# Patient Record
Sex: FEMALE | Race: OTHER
[De-identification: names, ages, dates, MRNs, and addresses within clinical notes are randomized per-mention and may not be internally consistent; named-entity substitution may affect disease eponyms.]

---

## 2017-02-03 ENCOUNTER — APPOINTMENT (OUTPATIENT)
Dept: GASTROENTEROLOGY | Facility: CLINIC | Age: 72
End: 2017-02-03

## 2017-02-24 ENCOUNTER — NON-APPOINTMENT (OUTPATIENT)
Age: 72
End: 2017-02-24

## 2017-02-24 ENCOUNTER — APPOINTMENT (OUTPATIENT)
Dept: CARDIOLOGY | Facility: CLINIC | Age: 72
End: 2017-02-24

## 2017-02-24 VITALS
BODY MASS INDEX: 26.24 KG/M2 | OXYGEN SATURATION: 97 % | SYSTOLIC BLOOD PRESSURE: 113 MMHG | HEIGHT: 61 IN | HEART RATE: 66 BPM | RESPIRATION RATE: 12 BRPM | WEIGHT: 139 LBS | DIASTOLIC BLOOD PRESSURE: 74 MMHG

## 2017-02-24 DIAGNOSIS — I83.93 ASYMPTOMATIC VARICOSE VEINS OF BILATERAL LOWER EXTREMITIES: ICD-10-CM

## 2017-04-12 ENCOUNTER — RX RENEWAL (OUTPATIENT)
Age: 72
End: 2017-04-12

## 2017-05-12 ENCOUNTER — RX RENEWAL (OUTPATIENT)
Age: 72
End: 2017-05-12

## 2017-05-23 ENCOUNTER — APPOINTMENT (OUTPATIENT)
Dept: ENDOCRINOLOGY | Facility: CLINIC | Age: 72
End: 2017-05-23

## 2017-05-23 VITALS
BODY MASS INDEX: 27 KG/M2 | HEIGHT: 61 IN | DIASTOLIC BLOOD PRESSURE: 83 MMHG | WEIGHT: 143 LBS | RESPIRATION RATE: 12 BRPM | HEART RATE: 82 BPM | SYSTOLIC BLOOD PRESSURE: 144 MMHG | OXYGEN SATURATION: 99 %

## 2017-05-23 DIAGNOSIS — M25.569 PAIN IN UNSPECIFIED KNEE: ICD-10-CM

## 2017-05-23 LAB
T4 FREE SERPL-MCNC: 1.3 NG/DL
TSH SERPL-ACNC: 5.42 UIU/ML

## 2017-05-26 ENCOUNTER — RX RENEWAL (OUTPATIENT)
Age: 72
End: 2017-05-26

## 2017-06-01 ENCOUNTER — NON-APPOINTMENT (OUTPATIENT)
Age: 72
End: 2017-06-01

## 2017-06-01 ENCOUNTER — APPOINTMENT (OUTPATIENT)
Dept: CARDIOLOGY | Facility: CLINIC | Age: 72
End: 2017-06-01

## 2017-06-01 VITALS
SYSTOLIC BLOOD PRESSURE: 151 MMHG | HEIGHT: 61 IN | BODY MASS INDEX: 27 KG/M2 | WEIGHT: 143 LBS | TEMPERATURE: 99 F | DIASTOLIC BLOOD PRESSURE: 86 MMHG | HEART RATE: 78 BPM | OXYGEN SATURATION: 99 % | RESPIRATION RATE: 12 BRPM

## 2017-06-01 VITALS — DIASTOLIC BLOOD PRESSURE: 80 MMHG | HEART RATE: 72 BPM | SYSTOLIC BLOOD PRESSURE: 146 MMHG

## 2017-06-02 LAB
CHOLEST SERPL-MCNC: 187 MG/DL
CHOLEST/HDLC SERPL: 3.2 RATIO
HDLC SERPL-MCNC: 59 MG/DL
LDLC SERPL CALC-MCNC: 70 MG/DL
TRIGL SERPL-MCNC: 291 MG/DL

## 2017-06-07 LAB
ALBUMIN SERPL ELPH-MCNC: 4.3 G/DL
ALP BLD-CCNC: 82 U/L
ALT SERPL-CCNC: 13 U/L
ANION GAP SERPL CALC-SCNC: 15 MMOL/L
AST SERPL-CCNC: 20 U/L
BILIRUB SERPL-MCNC: 0.2 MG/DL
BUN SERPL-MCNC: 14 MG/DL
CALCIUM SERPL-MCNC: 9.1 MG/DL
CHLORIDE SERPL-SCNC: 102 MMOL/L
CO2 SERPL-SCNC: 24 MMOL/L
CREAT SERPL-MCNC: 0.84 MG/DL
GLUCOSE SERPL-MCNC: 87 MG/DL
POTASSIUM SERPL-SCNC: 4.4 MMOL/L
PROT SERPL-MCNC: 7.6 G/DL
SODIUM SERPL-SCNC: 141 MMOL/L

## 2017-08-01 ENCOUNTER — RX RENEWAL (OUTPATIENT)
Age: 72
End: 2017-08-01

## 2017-08-15 ENCOUNTER — RX RENEWAL (OUTPATIENT)
Age: 72
End: 2017-08-15

## 2017-09-01 ENCOUNTER — RX RENEWAL (OUTPATIENT)
Age: 72
End: 2017-09-01

## 2017-09-16 ENCOUNTER — APPOINTMENT (OUTPATIENT)
Dept: CARDIOLOGY | Facility: CLINIC | Age: 72
End: 2017-09-16
Payer: MEDICAID

## 2017-09-16 ENCOUNTER — NON-APPOINTMENT (OUTPATIENT)
Age: 72
End: 2017-09-16

## 2017-09-16 VITALS
HEART RATE: 70 BPM | RESPIRATION RATE: 12 BRPM | HEIGHT: 61 IN | BODY MASS INDEX: 27.56 KG/M2 | SYSTOLIC BLOOD PRESSURE: 133 MMHG | OXYGEN SATURATION: 99 % | WEIGHT: 146 LBS | DIASTOLIC BLOOD PRESSURE: 80 MMHG | TEMPERATURE: 98.6 F

## 2017-09-16 PROCEDURE — 99214 OFFICE O/P EST MOD 30 MIN: CPT | Mod: 25

## 2017-09-16 PROCEDURE — 93000 ELECTROCARDIOGRAM COMPLETE: CPT

## 2017-09-18 ENCOUNTER — APPOINTMENT (OUTPATIENT)
Dept: INTERNAL MEDICINE | Facility: CLINIC | Age: 72
End: 2017-09-18
Payer: MEDICAID

## 2017-09-18 VITALS
HEART RATE: 71 BPM | BODY MASS INDEX: 27.56 KG/M2 | DIASTOLIC BLOOD PRESSURE: 75 MMHG | HEIGHT: 61 IN | TEMPERATURE: 98 F | SYSTOLIC BLOOD PRESSURE: 125 MMHG | WEIGHT: 146 LBS | RESPIRATION RATE: 12 BRPM | OXYGEN SATURATION: 95 %

## 2017-09-18 DIAGNOSIS — M79.671 PAIN IN RIGHT FOOT: ICD-10-CM

## 2017-09-18 PROCEDURE — 99214 OFFICE O/P EST MOD 30 MIN: CPT

## 2017-09-20 LAB
ALBUMIN SERPL ELPH-MCNC: 4.2 G/DL
ALP BLD-CCNC: 72 U/L
ALT SERPL-CCNC: 13 U/L
AST SERPL-CCNC: 20 U/L
BILIRUB DIRECT SERPL-MCNC: 0.1 MG/DL
BILIRUB INDIRECT SERPL-MCNC: 0.2 MG/DL
BILIRUB SERPL-MCNC: 0.3 MG/DL
CHOLEST SERPL-MCNC: 159 MG/DL
CHOLEST/HDLC SERPL: 2.8 RATIO
HDLC SERPL-MCNC: 56 MG/DL
LDLC SERPL CALC-MCNC: 74 MG/DL
PROT SERPL-MCNC: 7.1 G/DL
TRIGL SERPL-MCNC: 145 MG/DL

## 2017-10-03 ENCOUNTER — RX RENEWAL (OUTPATIENT)
Age: 72
End: 2017-10-03

## 2017-10-03 ENCOUNTER — MEDICATION RENEWAL (OUTPATIENT)
Age: 72
End: 2017-10-03

## 2017-10-06 ENCOUNTER — RESULT REVIEW (OUTPATIENT)
Age: 72
End: 2017-10-06

## 2017-10-06 ENCOUNTER — RX RENEWAL (OUTPATIENT)
Age: 72
End: 2017-10-06

## 2017-11-07 ENCOUNTER — APPOINTMENT (OUTPATIENT)
Dept: ENDOCRINOLOGY | Facility: CLINIC | Age: 72
End: 2017-11-07

## 2017-11-19 ENCOUNTER — LABORATORY RESULT (OUTPATIENT)
Age: 72
End: 2017-11-19

## 2017-11-20 ENCOUNTER — APPOINTMENT (OUTPATIENT)
Dept: CARDIOLOGY | Facility: CLINIC | Age: 72
End: 2017-11-20

## 2017-11-28 ENCOUNTER — APPOINTMENT (OUTPATIENT)
Dept: ENDOCRINOLOGY | Facility: CLINIC | Age: 72
End: 2017-11-28
Payer: MEDICAID

## 2017-11-28 VITALS
DIASTOLIC BLOOD PRESSURE: 83 MMHG | HEART RATE: 55 BPM | HEIGHT: 61 IN | OXYGEN SATURATION: 100 % | WEIGHT: 147 LBS | BODY MASS INDEX: 27.75 KG/M2 | RESPIRATION RATE: 12 BRPM | SYSTOLIC BLOOD PRESSURE: 133 MMHG

## 2017-11-28 PROCEDURE — 99213 OFFICE O/P EST LOW 20 MIN: CPT

## 2017-12-02 ENCOUNTER — APPOINTMENT (OUTPATIENT)
Dept: CARDIOLOGY | Facility: CLINIC | Age: 72
End: 2017-12-02
Payer: MEDICAID

## 2017-12-02 ENCOUNTER — NON-APPOINTMENT (OUTPATIENT)
Age: 72
End: 2017-12-02

## 2017-12-02 VITALS
RESPIRATION RATE: 12 BRPM | SYSTOLIC BLOOD PRESSURE: 138 MMHG | TEMPERATURE: 97.6 F | DIASTOLIC BLOOD PRESSURE: 80 MMHG | HEIGHT: 61 IN | HEART RATE: 70 BPM | WEIGHT: 143 LBS | OXYGEN SATURATION: 99 % | BODY MASS INDEX: 27 KG/M2

## 2017-12-02 VITALS — DIASTOLIC BLOOD PRESSURE: 68 MMHG | SYSTOLIC BLOOD PRESSURE: 130 MMHG

## 2017-12-02 PROCEDURE — 93000 ELECTROCARDIOGRAM COMPLETE: CPT

## 2017-12-02 PROCEDURE — 99214 OFFICE O/P EST MOD 30 MIN: CPT | Mod: 25

## 2017-12-03 ENCOUNTER — NON-APPOINTMENT (OUTPATIENT)
Age: 72
End: 2017-12-03

## 2017-12-19 ENCOUNTER — APPOINTMENT (OUTPATIENT)
Dept: ENDOCRINOLOGY | Facility: CLINIC | Age: 72
End: 2017-12-19

## 2018-01-10 ENCOUNTER — APPOINTMENT (OUTPATIENT)
Dept: CARDIOLOGY | Facility: CLINIC | Age: 73
End: 2018-01-10

## 2018-01-16 LAB
T4 FREE SERPL-MCNC: 1.7 NG/DL
TSH SERPL-ACNC: 0.57 UIU/ML

## 2018-02-26 ENCOUNTER — RX RENEWAL (OUTPATIENT)
Age: 73
End: 2018-02-26

## 2018-03-27 ENCOUNTER — RX RENEWAL (OUTPATIENT)
Age: 73
End: 2018-03-27

## 2018-04-25 ENCOUNTER — MEDICATION RENEWAL (OUTPATIENT)
Age: 73
End: 2018-04-25

## 2018-04-28 ENCOUNTER — APPOINTMENT (OUTPATIENT)
Dept: CARDIOLOGY | Facility: CLINIC | Age: 73
End: 2018-04-28
Payer: MEDICAID

## 2018-04-28 ENCOUNTER — NON-APPOINTMENT (OUTPATIENT)
Age: 73
End: 2018-04-28

## 2018-04-28 VITALS
RESPIRATION RATE: 12 BRPM | HEIGHT: 61 IN | WEIGHT: 147 LBS | DIASTOLIC BLOOD PRESSURE: 84 MMHG | BODY MASS INDEX: 27.75 KG/M2 | HEART RATE: 74 BPM | TEMPERATURE: 98.3 F | OXYGEN SATURATION: 98 % | SYSTOLIC BLOOD PRESSURE: 128 MMHG

## 2018-04-28 PROCEDURE — 99214 OFFICE O/P EST MOD 30 MIN: CPT | Mod: 25

## 2018-04-28 PROCEDURE — 93000 ELECTROCARDIOGRAM COMPLETE: CPT

## 2018-04-29 LAB
25(OH)D3 SERPL-MCNC: 36.7 NG/ML
ALBUMIN SERPL ELPH-MCNC: 4 G/DL
ALP BLD-CCNC: 74 U/L
ALT SERPL-CCNC: 13 U/L
ANION GAP SERPL CALC-SCNC: 14 MMOL/L
AST SERPL-CCNC: 26 U/L
BASOPHILS # BLD AUTO: 0.03 K/UL
BASOPHILS NFR BLD AUTO: 0.6 %
BILIRUB SERPL-MCNC: 0.4 MG/DL
BUN SERPL-MCNC: 11 MG/DL
CALCIUM SERPL-MCNC: 9.3 MG/DL
CHLORIDE SERPL-SCNC: 103 MMOL/L
CHOLEST SERPL-MCNC: 164 MG/DL
CHOLEST/HDLC SERPL: 2.9 RATIO
CO2 SERPL-SCNC: 25 MMOL/L
CREAT SERPL-MCNC: 0.93 MG/DL
EOSINOPHIL # BLD AUTO: 0.15 K/UL
EOSINOPHIL NFR BLD AUTO: 2.8 %
GLUCOSE SERPL-MCNC: 75 MG/DL
HBA1C MFR BLD HPLC: 5.3 %
HCT VFR BLD CALC: 40.7 %
HDLC SERPL-MCNC: 57 MG/DL
HGB BLD-MCNC: 12.5 G/DL
IMM GRANULOCYTES NFR BLD AUTO: 0.2 %
LDLC SERPL CALC-MCNC: 80 MG/DL
LYMPHOCYTES # BLD AUTO: 1.69 K/UL
LYMPHOCYTES NFR BLD AUTO: 31.1 %
MAN DIFF?: NORMAL
MCHC RBC-ENTMCNC: 29.1 PG
MCHC RBC-ENTMCNC: 30.7 GM/DL
MCV RBC AUTO: 94.7 FL
MONOCYTES # BLD AUTO: 0.42 K/UL
MONOCYTES NFR BLD AUTO: 7.7 %
NEUTROPHILS # BLD AUTO: 3.14 K/UL
NEUTROPHILS NFR BLD AUTO: 57.6 %
PLATELET # BLD AUTO: 245 K/UL
POTASSIUM SERPL-SCNC: 4.8 MMOL/L
PROT SERPL-MCNC: 7.1 G/DL
RBC # BLD: 4.3 M/UL
RBC # FLD: 13.5 %
SODIUM SERPL-SCNC: 142 MMOL/L
T4 FREE SERPL-MCNC: 1.9 NG/DL
TRIGL SERPL-MCNC: 135 MG/DL
TSH SERPL-ACNC: 0.65 UIU/ML
WBC # FLD AUTO: 5.44 K/UL

## 2018-06-22 ENCOUNTER — APPOINTMENT (OUTPATIENT)
Dept: INTERNAL MEDICINE | Facility: CLINIC | Age: 73
End: 2018-06-22
Payer: MEDICAID

## 2018-06-22 VITALS
DIASTOLIC BLOOD PRESSURE: 80 MMHG | OXYGEN SATURATION: 100 % | WEIGHT: 143 LBS | HEART RATE: 100 BPM | HEIGHT: 61 IN | BODY MASS INDEX: 27 KG/M2 | TEMPERATURE: 98.8 F | SYSTOLIC BLOOD PRESSURE: 138 MMHG | RESPIRATION RATE: 12 BRPM

## 2018-06-22 DIAGNOSIS — Z87.09 PERSONAL HISTORY OF OTHER DISEASES OF THE RESPIRATORY SYSTEM: ICD-10-CM

## 2018-06-22 DIAGNOSIS — B97.89 ACUTE UPPER RESPIRATORY INFECTION, UNSPECIFIED: ICD-10-CM

## 2018-06-22 DIAGNOSIS — J06.9 ACUTE UPPER RESPIRATORY INFECTION, UNSPECIFIED: ICD-10-CM

## 2018-06-22 DIAGNOSIS — B34.9 VIRAL INFECTION, UNSPECIFIED: ICD-10-CM

## 2018-06-22 PROCEDURE — 94640 AIRWAY INHALATION TREATMENT: CPT

## 2018-06-22 PROCEDURE — 99214 OFFICE O/P EST MOD 30 MIN: CPT | Mod: 25

## 2018-06-22 RX ORDER — MULTIVIT-MIN/FOLIC/VIT K/LYCOP 400-300MCG
1000 TABLET ORAL DAILY
Qty: 30 | Refills: 1 | Status: ACTIVE | COMMUNITY
Start: 2018-06-22 | End: 1900-01-01

## 2018-06-22 RX ADMIN — ALBUTEROL SULFATE 1 0.083%: 2.5 SOLUTION RESPIRATORY (INHALATION) at 00:00

## 2018-06-27 RX ORDER — ALBUTEROL SULFATE 2.5 MG/3ML
(2.5 MG/3ML) SOLUTION RESPIRATORY (INHALATION)
Qty: 0 | Refills: 0 | Status: COMPLETED | OUTPATIENT
Start: 2018-06-22

## 2018-07-21 NOTE — PHYSICAL EXAM
[No Acute Distress] : no acute distress [Well Nourished] : well nourished [Well Developed] : well developed [Well-Appearing] : well-appearing [Normal Sclera/Conjunctiva] : normal sclera/conjunctiva [EOMI] : extraocular movements intact [Normal Outer Ear/Nose] : the outer ears and nose were normal in appearance [Normal TMs] : both tympanic membranes were normal [No JVD] : no jugular venous distention [Supple] : supple [No Lymphadenopathy] : no lymphadenopathy [Thyroid Normal, No Nodules] : the thyroid was normal and there were no nodules present [No Respiratory Distress] : no respiratory distress  [No Accessory Muscle Use] : no accessory muscle use [Normal Rate] : normal rate  [Regular Rhythm] : with a regular rhythm [Normal S1, S2] : normal S1 and S2 [No Murmur] : no murmur heard [Soft] : abdomen soft [Non Tender] : non-tender [Non-distended] : non-distended [No Masses] : no abdominal mass palpated [No HSM] : no HSM [Normal Bowel Sounds] : normal bowel sounds [Normal Posterior Cervical Nodes] : no posterior cervical lymphadenopathy [Normal Anterior Cervical Nodes] : no anterior cervical lymphadenopathy [No CVA Tenderness] : no CVA  tenderness [No Spinal Tenderness] : no spinal tenderness [No Joint Swelling] : no joint swelling [Grossly Normal Strength/Tone] : grossly normal strength/tone [No Rash] : no rash [Normal Gait] : normal gait [Coordination Grossly Intact] : coordination grossly intact [No Focal Deficits] : no focal deficits [Deep Tendon Reflexes (DTR)] : deep tendon reflexes were 2+ and symmetric [Normal Affect] : the affect was normal [Normal Insight/Judgement] : insight and judgment were intact [de-identified] : nasal mucosal congestion with clear discharge; trace PND on oropharynx  [de-identified] : decreased aeration; scattered rhonchi B/L on expiration

## 2018-07-21 NOTE — ASSESSMENT
[FreeTextEntry1] : ______________________________________________________________ \par \par Procedure:   Pt given nebulizer w/albuterol.  \par Patient's lung exam after treatment: Improved aeration, more audible wheezes diffusely. \par Patient stated chest felt clearer and breathing was easier.  Patient denies any side effects.\par ______________________________________________________________ \par \par \par Assessment & Plan \par \par 73 yr old female presenting today w/ acute bronchitis w/bronchospasm and upper respiratory tract dz, w/cough, nasal congestion, and rhinorrhea.  Supportive care d/w pt:  rest, fluids, fever/pain mgmt, sx mgmt w/OTC remedies, rx Tamiflu, Ventolin and Flonase usage and sfx.  RTO if sx persist or worsen. Counseled patient for greater than 50% of this visit, including diagnoses information, medication usage and side effects, therapeutic goals and options, and followup. Patient's questions were answered. Patient expressed understanding of, and agreement with, assessment and plan.

## 2018-07-21 NOTE — HISTORY OF PRESENT ILLNESS
[Cold Symptoms] : cold symptoms [Earache (L)] : pain in left ear [Earache (R)] : pain in right ear [Moderate] : moderate [___ Days ago] : [unfilled] days ago [Constant] : constant [Congestion] : congestion [Cough] : cough [Sore Throat] : sore throat [Wheezing] : wheezing [Chills] : chills [Shortness Of Breath] : shortness of breath [Earache] : earache [Fatigue] : fatigue [Fever] : fever [Improving] : improving [FreeTextEntry5] : Vit C and ibuprofen [FreeTextEntry6] : slowly

## 2018-07-21 NOTE — REVIEW OF SYSTEMS
[Wheezing] : wheezing [Cough] : cough [Negative] : Heme/Lymph [FreeTextEntry2] : see HPI [FreeTextEntry4] : see HPI [FreeTextEntry6] : see HPI

## 2018-09-11 ENCOUNTER — APPOINTMENT (OUTPATIENT)
Dept: ENDOCRINOLOGY | Facility: CLINIC | Age: 73
End: 2018-09-11

## 2018-09-28 ENCOUNTER — RX RENEWAL (OUTPATIENT)
Age: 73
End: 2018-09-28

## 2018-10-29 ENCOUNTER — MEDICATION RENEWAL (OUTPATIENT)
Age: 73
End: 2018-10-29

## 2018-10-31 ENCOUNTER — APPOINTMENT (OUTPATIENT)
Dept: CARDIOLOGY | Facility: CLINIC | Age: 73
End: 2018-10-31
Payer: MEDICAID

## 2018-10-31 ENCOUNTER — NON-APPOINTMENT (OUTPATIENT)
Age: 73
End: 2018-10-31

## 2018-10-31 VITALS
RESPIRATION RATE: 12 BRPM | SYSTOLIC BLOOD PRESSURE: 138 MMHG | WEIGHT: 145 LBS | HEIGHT: 61 IN | HEART RATE: 81 BPM | OXYGEN SATURATION: 97 % | BODY MASS INDEX: 27.38 KG/M2 | DIASTOLIC BLOOD PRESSURE: 79 MMHG

## 2018-10-31 PROCEDURE — 99214 OFFICE O/P EST MOD 30 MIN: CPT | Mod: 25

## 2018-10-31 PROCEDURE — 93000 ELECTROCARDIOGRAM COMPLETE: CPT

## 2018-11-24 LAB
25(OH)D3 SERPL-MCNC: 41.9 NG/ML
ALBUMIN SERPL ELPH-MCNC: 4.1 G/DL
ALP BLD-CCNC: 67 U/L
ALT SERPL-CCNC: 14 U/L
ANION GAP SERPL CALC-SCNC: 15 MMOL/L
AST SERPL-CCNC: 24 U/L
BASOPHILS # BLD AUTO: 0.02 K/UL
BASOPHILS NFR BLD AUTO: 0.4 %
BILIRUB SERPL-MCNC: 0.4 MG/DL
BUN SERPL-MCNC: 12 MG/DL
CALCIUM SERPL-MCNC: 9.4 MG/DL
CHLORIDE SERPL-SCNC: 103 MMOL/L
CHOLEST SERPL-MCNC: 186 MG/DL
CHOLEST/HDLC SERPL: 3.3 RATIO
CO2 SERPL-SCNC: 25 MMOL/L
CREAT SERPL-MCNC: 0.94 MG/DL
EOSINOPHIL # BLD AUTO: 0.04 K/UL
EOSINOPHIL NFR BLD AUTO: 0.8 %
GLUCOSE SERPL-MCNC: 71 MG/DL
HBA1C MFR BLD HPLC: 5.3 %
HCT VFR BLD CALC: 40 %
HDLC SERPL-MCNC: 56 MG/DL
HGB BLD-MCNC: 12.5 G/DL
IMM GRANULOCYTES NFR BLD AUTO: 0.2 %
LDLC SERPL CALC-MCNC: 109 MG/DL
LYMPHOCYTES # BLD AUTO: 1.64 K/UL
LYMPHOCYTES NFR BLD AUTO: 32.8 %
MAN DIFF?: NORMAL
MCHC RBC-ENTMCNC: 29.6 PG
MCHC RBC-ENTMCNC: 31.3 GM/DL
MCV RBC AUTO: 94.8 FL
MONOCYTES # BLD AUTO: 0.42 K/UL
MONOCYTES NFR BLD AUTO: 8.4 %
NEUTROPHILS # BLD AUTO: 2.87 K/UL
NEUTROPHILS NFR BLD AUTO: 57.4 %
PLATELET # BLD AUTO: 257 K/UL
POTASSIUM SERPL-SCNC: 4.7 MMOL/L
PROT SERPL-MCNC: 7.4 G/DL
RBC # BLD: 4.22 M/UL
RBC # FLD: 13.5 %
SODIUM SERPL-SCNC: 143 MMOL/L
T4 FREE SERPL-MCNC: 1.8 NG/DL
TRIGL SERPL-MCNC: 106 MG/DL
TSH SERPL-ACNC: 0.29 UIU/ML
WBC # FLD AUTO: 5 K/UL

## 2018-12-11 ENCOUNTER — APPOINTMENT (OUTPATIENT)
Dept: ENDOCRINOLOGY | Facility: CLINIC | Age: 73
End: 2018-12-11
Payer: MEDICAID

## 2018-12-11 VITALS
HEART RATE: 83 BPM | RESPIRATION RATE: 12 BRPM | DIASTOLIC BLOOD PRESSURE: 82 MMHG | BODY MASS INDEX: 27.56 KG/M2 | OXYGEN SATURATION: 98 % | HEIGHT: 61 IN | SYSTOLIC BLOOD PRESSURE: 123 MMHG | WEIGHT: 146 LBS | TEMPERATURE: 98.3 F

## 2018-12-11 PROCEDURE — 99215 OFFICE O/P EST HI 40 MIN: CPT

## 2018-12-17 LAB
T4 FREE SERPL-MCNC: 1.7 NG/DL
TSH SERPL-ACNC: 0.3 UIU/ML

## 2019-02-04 ENCOUNTER — NON-APPOINTMENT (OUTPATIENT)
Age: 74
End: 2019-02-04

## 2019-02-04 ENCOUNTER — APPOINTMENT (OUTPATIENT)
Dept: INTERNAL MEDICINE | Facility: CLINIC | Age: 74
End: 2019-02-04
Payer: MEDICAID

## 2019-02-04 VITALS
WEIGHT: 143 LBS | SYSTOLIC BLOOD PRESSURE: 128 MMHG | OXYGEN SATURATION: 98 % | HEIGHT: 61 IN | DIASTOLIC BLOOD PRESSURE: 77 MMHG | BODY MASS INDEX: 27 KG/M2 | HEART RATE: 73 BPM | RESPIRATION RATE: 12 BRPM

## 2019-02-04 DIAGNOSIS — M54.5 LOW BACK PAIN: ICD-10-CM

## 2019-02-04 DIAGNOSIS — M25.562 PAIN IN LEFT KNEE: ICD-10-CM

## 2019-02-04 PROCEDURE — 99213 OFFICE O/P EST LOW 20 MIN: CPT | Mod: 25

## 2019-02-04 PROCEDURE — 93000 ELECTROCARDIOGRAM COMPLETE: CPT

## 2019-02-04 PROCEDURE — 99397 PER PM REEVAL EST PAT 65+ YR: CPT | Mod: 25

## 2019-02-06 LAB
25(OH)D3 SERPL-MCNC: 44.5 NG/ML
ALBUMIN SERPL ELPH-MCNC: 4.2 G/DL
ALP BLD-CCNC: 74 U/L
ALT SERPL-CCNC: 13 U/L
ANION GAP SERPL CALC-SCNC: 15 MMOL/L
APPEARANCE: CLEAR
AST SERPL-CCNC: 25 U/L
BASOPHILS # BLD AUTO: 0.03 K/UL
BASOPHILS NFR BLD AUTO: 0.4 %
BILIRUB SERPL-MCNC: 0.2 MG/DL
BILIRUBIN URINE: NEGATIVE
BLOOD URINE: NEGATIVE
BUN SERPL-MCNC: 15 MG/DL
CALCIUM SERPL-MCNC: 9.8 MG/DL
CHLORIDE SERPL-SCNC: 97 MMOL/L
CHOLEST SERPL-MCNC: 196 MG/DL
CHOLEST/HDLC SERPL: 3.4 RATIO
CO2 SERPL-SCNC: 27 MMOL/L
COLOR: YELLOW
CREAT SERPL-MCNC: 0.9 MG/DL
EOSINOPHIL # BLD AUTO: 0.11 K/UL
EOSINOPHIL NFR BLD AUTO: 1.6 %
GLUCOSE QUALITATIVE U: NEGATIVE MG/DL
GLUCOSE SERPL-MCNC: 85 MG/DL
HBA1C MFR BLD HPLC: 5.2 %
HCT VFR BLD CALC: 41.7 %
HDLC SERPL-MCNC: 58 MG/DL
HGB BLD-MCNC: 13.1 G/DL
IMM GRANULOCYTES NFR BLD AUTO: 0.1 %
KETONES URINE: NEGATIVE
LDLC SERPL CALC-MCNC: 112 MG/DL
LEUKOCYTE ESTERASE URINE: NEGATIVE
LYMPHOCYTES # BLD AUTO: 2.48 K/UL
LYMPHOCYTES NFR BLD AUTO: 35.7 %
MAN DIFF?: NORMAL
MCHC RBC-ENTMCNC: 29 PG
MCHC RBC-ENTMCNC: 31.4 GM/DL
MCV RBC AUTO: 92.3 FL
MONOCYTES # BLD AUTO: 0.57 K/UL
MONOCYTES NFR BLD AUTO: 8.2 %
NEUTROPHILS # BLD AUTO: 3.75 K/UL
NEUTROPHILS NFR BLD AUTO: 54 %
NITRITE URINE: NEGATIVE
PH URINE: 5.5
PLATELET # BLD AUTO: 251 K/UL
POTASSIUM SERPL-SCNC: 4.2 MMOL/L
PROT SERPL-MCNC: 7.6 G/DL
PROTEIN URINE: NEGATIVE MG/DL
RBC # BLD: 4.52 M/UL
RBC # FLD: 13.5 %
SODIUM SERPL-SCNC: 139 MMOL/L
SPECIFIC GRAVITY URINE: 1.01
TRIGL SERPL-MCNC: 128 MG/DL
TSH SERPL-ACNC: 2.34 UIU/ML
UROBILINOGEN URINE: NEGATIVE MG/DL
VIT B12 SERPL-MCNC: 1466 PG/ML
WBC # FLD AUTO: 6.95 K/UL

## 2019-02-13 ENCOUNTER — NON-APPOINTMENT (OUTPATIENT)
Age: 74
End: 2019-02-13

## 2019-02-18 NOTE — HISTORY OF PRESENT ILLNESS
[de-identified] : 74 yo female, with Hx of hypercholesterolemia, hypothyroid, SVT, accompanied by her daughter, presents for annual physical\par Daughter states that yesterday around 1 pm pt had an episode where she felt a mild headache followed by nausea, broke into a cold sweat and felt palpitations. Episode lasted about 5 minutes and afterwards pt says she felt fine.  Had some carrot juice and a banana after the episode. Pt denies having chest pain, SOB, dizziness\par Pt says she thinks it was because she did not eat her regular breakfast, had less than usual. Daughter says that about a half hour before the episode pt told her that she was feeling hungry\par Also pt complaints of pain in her back and left knee on and off. She takes Ibuprofen which helps with the pain

## 2019-02-18 NOTE — ASSESSMENT
[FreeTextEntry1] : Physical\par blood work ordered\par referred for mammography and Bone density scan\par She is UTD with her PAP and colonoscopy\par does not want flu vaccine\par \par episode of nausea, sweating, palpitations-? hypoglycemic episode\par EKG: NSR\par holter monitor ordered\par f/u with cardiology\par \par Low back pain, left kneee pain-likely arthritis\par cont ibuprofen prn\par referred for PT\par \par  Hx of hypercholesterolemia, hypothyroid, SVT\par cont current meds\par \par f/u in one week for lab results\par \par

## 2019-02-18 NOTE — HEALTH RISK ASSESSMENT
[] : No [MammogramDate] : March 2017 [PapSmearDate] : 2018 [BoneDensityDate] : 2 years ago [ColonoscopyDate] : 1/11/17

## 2019-03-11 ENCOUNTER — APPOINTMENT (OUTPATIENT)
Dept: CARDIOLOGY | Facility: CLINIC | Age: 74
End: 2019-03-11
Payer: MEDICAID

## 2019-03-11 ENCOUNTER — NON-APPOINTMENT (OUTPATIENT)
Age: 74
End: 2019-03-11

## 2019-03-11 VITALS
HEART RATE: 98 BPM | HEIGHT: 61 IN | WEIGHT: 146 LBS | DIASTOLIC BLOOD PRESSURE: 80 MMHG | TEMPERATURE: 98.3 F | SYSTOLIC BLOOD PRESSURE: 142 MMHG | BODY MASS INDEX: 27.56 KG/M2 | RESPIRATION RATE: 12 BRPM | OXYGEN SATURATION: 98 %

## 2019-03-11 VITALS — DIASTOLIC BLOOD PRESSURE: 70 MMHG | SYSTOLIC BLOOD PRESSURE: 120 MMHG | HEART RATE: 84 BPM

## 2019-03-11 PROCEDURE — 99214 OFFICE O/P EST MOD 30 MIN: CPT | Mod: 25

## 2019-03-11 PROCEDURE — 93000 ELECTROCARDIOGRAM COMPLETE: CPT

## 2019-03-11 NOTE — DISCUSSION/SUMMARY
[FreeTextEntry1] : IMPRESSION: Ms. Dill is a 73 year old woman with a history of hyperlipidemia, mitral regurgitation, hypothyroidism, SVT, and APCs who presents today for follow up of rhythm disorder and hyperlipidemia.\par \par PLAN:\par 1. There was no ectopy on exam or her ECG, however, her heart rate is on the higher side which she feels is secondary to being tired as she has been on the go all day and may possibly be dehydrated. I have asked her to stay well hydrated. Her recent Holter revealed occasional APCs. We discussed possibly increasing the dose of her Metoprolol, however, she wants to hold off since she has been feeling well. For now, she will continue on Toprol XL 37.5mg daily. She will have a 2 day ZIO Patch in 4-6 weeks.\par 2. She will continue on Lipitor 10mg every other day along with diet modification given that her LDL has been trending up.   \par 3. I will discuss with her at the time of her next visit about scheduling a follow up echocardiogram for her mitral regurgitation.\par 4. She will follow up with me in 3 months or sooner should she experience any recurrent symptoms or should there be any changes on her repeat Holter monitor.

## 2019-03-11 NOTE — REVIEW OF SYSTEMS
[see HPI] : see HPI [Palpitations] : palpitations [Negative] : Heme/Lymph [Chest Pain] : no chest pain

## 2019-03-11 NOTE — PHYSICAL EXAM
[General Appearance - Well Developed] : well developed [Normal Appearance] : normal appearance [Well Groomed] : well groomed [General Appearance - Well Nourished] : well nourished [No Deformities] : no deformities [General Appearance - In No Acute Distress] : no acute distress [Normal Conjunctiva] : the conjunctiva exhibited no abnormalities [Normal Oral Mucosa] : normal oral mucosa [No Oral Pallor] : no oral pallor [No Oral Cyanosis] : no oral cyanosis [Normal Jugular Venous A Waves Present] : normal jugular venous A waves present [Normal Jugular Venous V Waves Present] : normal jugular venous V waves present [No Jugular Venous Jo A Waves] : no jugular venous jo A waves [Respiration, Rhythm And Depth] : normal respiratory rhythm and effort [Exaggerated Use Of Accessory Muscles For Inspiration] : no accessory muscle use [Auscultation Breath Sounds / Voice Sounds] : lungs were clear to auscultation bilaterally [Bowel Sounds] : normal bowel sounds [Abdomen Soft] : soft [Abdomen Tenderness] : non-tender [Abnormal Walk] : normal gait [Nail Clubbing] : no clubbing of the fingernails [Cyanosis, Localized] : no localized cyanosis [Petechial Hemorrhages (___cm)] : no petechial hemorrhages [Skin Color & Pigmentation] : normal skin color and pigmentation [] : no rash [Skin Lesions] : no skin lesions [No Skin Ulcers] : no skin ulcer [Oriented To Time, Place, And Person] : oriented to person, place, and time [Affect] : the affect was normal [Mood] : the mood was normal [No Anxiety] : not feeling anxious [Normal Rate] : normal [Rhythm Regular] : regular [Normal S1] : normal S1 [Normal S2] : normal S2 [II] : a grade 2 [I] : a grade 1 [No Pitting Edema] : no pitting edema present [FreeTextEntry1] : Extraocular muscles intact. Anicteric sclerae. [Right Carotid Bruit] : no bruit heard over the right carotid [Left Carotid Bruit] : no bruit heard over the left carotid [Bruit] : no bruit heard

## 2019-03-11 NOTE — HISTORY OF PRESENT ILLNESS
[FreeTextEntry1] : Patient is a 73 year old woman with a history of hyperlipidemia, hypothyroidism, mitral regurgitation, SVT, and APCs who presents today for follow up of her rhythm disorder and hyperlipidemia. She states that about a month ago she had an episode of diaphoresis, palpitations, and a headache that lasted for about 1 minute. She has not experienced any similar episodes since then. She also states that the dose of her Synthroid was changes about 2-3 months ago. She otherwise denies any chest pain, dyspnea, or dizziness.

## 2019-04-02 DIAGNOSIS — R92.2 INCONCLUSIVE MAMMOGRAM: ICD-10-CM

## 2019-04-04 PROBLEM — R92.2 DENSE BREAST TISSUE ON MAMMOGRAM: Status: ACTIVE | Noted: 2019-04-04

## 2019-05-08 ENCOUNTER — NON-APPOINTMENT (OUTPATIENT)
Age: 74
End: 2019-05-08

## 2019-06-03 ENCOUNTER — APPOINTMENT (OUTPATIENT)
Dept: INTERNAL MEDICINE | Facility: CLINIC | Age: 74
End: 2019-06-03
Payer: MEDICAID

## 2019-06-03 VITALS
BODY MASS INDEX: 26.43 KG/M2 | DIASTOLIC BLOOD PRESSURE: 80 MMHG | RESPIRATION RATE: 12 BRPM | TEMPERATURE: 97.5 F | HEIGHT: 61 IN | OXYGEN SATURATION: 97 % | WEIGHT: 140 LBS | HEART RATE: 71 BPM | SYSTOLIC BLOOD PRESSURE: 144 MMHG

## 2019-06-03 PROCEDURE — 99214 OFFICE O/P EST MOD 30 MIN: CPT

## 2019-06-09 NOTE — HISTORY OF PRESENT ILLNESS
[de-identified] : 75 yo female, with Hx of hypercholesterolemia, hypothyroid, SVT, accompanied by her son, presents for post ER follow up visit\par Son states that 10 days ago on Saturday she woke up with a lot of pain going down the left leg. She went to an urgent care center the next day on Sunday.  She was diagnosed with sciatica, was given an injection, was prescribed Flexeril 5mg and was told to take 3 Advils. Pain did not get better and on Tuesday she went to [facility] ER. She was prescribed Tramadol 50mg and Ibuprofen 600mg. She also had another injection for pain.\par She saw Ortho on Friday. Was referred for MRI of L/S spine-awaiting authorization, prescribed medrol dose pack and Gabapentin 100mg TID\par She did not start the medrol dose pack yet ( as per pt's son they were told to check with her PMD prior to starting it-because pt is on fosamax)  She is taking the gabapentin and ibuprofen 600mg \par Pt states she still has pain going down the left leg feels tingling in the left leg down to the foot\par She has difficulty walking, siting\par

## 2019-06-09 NOTE — PHYSICAL EXAM
[No Acute Distress] : no acute distress [Well Nourished] : well nourished [Well Developed] : well developed [Well-Appearing] : well-appearing [Normal Sclera/Conjunctiva] : normal sclera/conjunctiva [Normal Outer Ear/Nose] : the outer ears and nose were normal in appearance [No JVD] : no jugular venous distention [No Respiratory Distress] : no respiratory distress  [Clear to Auscultation] : lungs were clear to auscultation bilaterally [Normal Rate] : normal rate  [Regular Rhythm] : with a regular rhythm [Normal S1, S2] : normal S1 and S2 [No Edema] : there was no peripheral edema [Soft] : abdomen soft [Non Tender] : non-tender [Non-distended] : non-distended [Normal Anterior Cervical Nodes] : no anterior cervical lymphadenopathy [No CVA Tenderness] : no CVA  tenderness [No Rash] : no rash [Alert and Oriented x3] : oriented to person, place, and time [Normal Insight/Judgement] : insight and judgment were intact [de-identified] : + tenderness left lower back, left buttocks area, ROM are limited at the L/S spine secondary to pain.  [de-identified] : Pt ambulating slowly guarding the left side due to pain in the left side of back and leg

## 2019-10-24 ENCOUNTER — RX RENEWAL (OUTPATIENT)
Age: 74
End: 2019-10-24

## 2019-10-25 ENCOUNTER — RX RENEWAL (OUTPATIENT)
Age: 74
End: 2019-10-25

## 2019-12-03 ENCOUNTER — APPOINTMENT (OUTPATIENT)
Dept: INTERNAL MEDICINE | Facility: CLINIC | Age: 74
End: 2019-12-03
Payer: MEDICAID

## 2019-12-03 VITALS
HEIGHT: 61 IN | DIASTOLIC BLOOD PRESSURE: 76 MMHG | WEIGHT: 140 LBS | HEART RATE: 84 BPM | RESPIRATION RATE: 12 BRPM | TEMPERATURE: 98.1 F | OXYGEN SATURATION: 98 % | SYSTOLIC BLOOD PRESSURE: 139 MMHG | BODY MASS INDEX: 26.43 KG/M2

## 2019-12-03 DIAGNOSIS — Z86.39 PERSONAL HISTORY OF OTHER ENDOCRINE, NUTRITIONAL AND METABOLIC DISEASE: ICD-10-CM

## 2019-12-03 PROCEDURE — 99214 OFFICE O/P EST MOD 30 MIN: CPT

## 2019-12-10 LAB
ALBUMIN SERPL ELPH-MCNC: 4.2 G/DL
ALP BLD-CCNC: 74 U/L
ALT SERPL-CCNC: 11 U/L
ANION GAP SERPL CALC-SCNC: 15 MMOL/L
AST SERPL-CCNC: 19 U/L
BILIRUB SERPL-MCNC: 0.2 MG/DL
BUN SERPL-MCNC: 13 MG/DL
CALCIUM SERPL-MCNC: 8.9 MG/DL
CHLORIDE SERPL-SCNC: 102 MMOL/L
CO2 SERPL-SCNC: 24 MMOL/L
CREAT SERPL-MCNC: 0.98 MG/DL
GLUCOSE SERPL-MCNC: 87 MG/DL
POTASSIUM SERPL-SCNC: 3.4 MMOL/L
PROT SERPL-MCNC: 6.7 G/DL
SODIUM SERPL-SCNC: 141 MMOL/L

## 2019-12-10 NOTE — ASSESSMENT
[FreeTextEntry1] :  Hx of hypercholesterolemia, hypothyroid, SVT\par cont current meds-renewed today\par blood work ordered\par \par Hx of sciatica\par cont PT-referral given\par \par follow up in one week for lab results

## 2019-12-10 NOTE — HISTORY OF PRESENT ILLNESS
[de-identified] : 73 yo female, with Hx of hypercholesterolemia, hypothyroid, SVT, accompanied by her son, presents for follow up visit and Rx renewals\par Son states that pt has been following with pain management for the left sciatica. Had epidural injection in the summer which as per pt and son helped her. She is also going for physical therapy\par She still has some pain and discomfort when she walks but overall she is doing better. She was offered another epidural injection but pt wants to hold off for now\par \par \par

## 2019-12-10 NOTE — PHYSICAL EXAM
[Normal Sclera/Conjunctiva] : normal sclera/conjunctiva [Normal Outer Ear/Nose] : the outer ears and nose were normal in appearance [No JVD] : no jugular venous distention [Normal] : normal rate, regular rhythm, normal S1 and S2 and no murmur heard [No Edema] : there was no peripheral edema [Soft] : abdomen soft [Non Tender] : non-tender [Normal Anterior Cervical Nodes] : no anterior cervical lymphadenopathy [No CVA Tenderness] : no CVA  tenderness [No Joint Swelling] : no joint swelling [No Rash] : no rash [No Focal Deficits] : no focal deficits [Normal Gait] : normal gait [Normal Affect] : the affect was normal [Alert and Oriented x3] : oriented to person, place, and time [Normal Insight/Judgement] : insight and judgment were intact

## 2019-12-11 LAB
CHOLEST SERPL-MCNC: 168 MG/DL
CHOLEST/HDLC SERPL: 3.2 RATIO
HDLC SERPL-MCNC: 53 MG/DL
LDLC SERPL CALC-MCNC: 74 MG/DL
TRIGL SERPL-MCNC: 203 MG/DL

## 2019-12-16 LAB — POTASSIUM SERPL-SCNC: 4.2 MMOL/L

## 2020-03-09 ENCOUNTER — RX RENEWAL (OUTPATIENT)
Age: 75
End: 2020-03-09

## 2020-05-19 ENCOUNTER — APPOINTMENT (OUTPATIENT)
Dept: CARDIOLOGY | Facility: CLINIC | Age: 75
End: 2020-05-19
Payer: MEDICAID

## 2020-05-19 PROCEDURE — 99213 OFFICE O/P EST LOW 20 MIN: CPT | Mod: 25,95

## 2020-05-19 NOTE — DISCUSSION/SUMMARY
[FreeTextEntry1] : IMPRESSION: Ms. Dill is a 75 year old woman with a history of hyperlipidemia, mitral regurgitation, hypothyroidism, SVT, and APCs who is evaluated today via Telehealth for follow up of rhythm disorder.\par \par PLAN:\par 1. She has been feeling well without any palpitations. She will continue on Toprol XL 37.5mg daily. \par 2. She will continue on Lipitor 10mg every other day along with diet modification for her dyslipidemia. Her most recent LDL was very good with elevated triglycerides.    \par 3. I have asked her to schedule an echocardiogram at the time of her next visit for her mitral regurgitation.\par 4. She will follow up with me in 3 months or sooner should she experience any symptoms in the interim.\par \par Time started- 10 AM\par Time ended- 10:16 AM

## 2020-05-19 NOTE — HISTORY OF PRESENT ILLNESS
[Home] : at home, [unfilled] , at the time of the visit. [Other Location: e.g. Home (Enter Location, City,State)___] : at [unfilled] [Other:____] : [unfilled] [Verbal consent obtained from patient] : the patient, [unfilled] [FreeTextEntry1] : Patient is a 75 year old woman with a history of hyperlipidemia, hypothyroidism, mitral regurgitation, SVT, and APCs who is evaluated today via Telehealth for rhythm disorder. She states that she has been feeling very well denying any chest pain, palpitations, dyspnea, headaches, or dizziness.

## 2020-05-27 ENCOUNTER — RX RENEWAL (OUTPATIENT)
Age: 75
End: 2020-05-27

## 2020-07-09 ENCOUNTER — APPOINTMENT (OUTPATIENT)
Dept: CARDIOLOGY | Facility: CLINIC | Age: 75
End: 2020-07-09
Payer: MEDICAID

## 2020-07-09 ENCOUNTER — APPOINTMENT (OUTPATIENT)
Dept: INTERNAL MEDICINE | Facility: CLINIC | Age: 75
End: 2020-07-09
Payer: MEDICAID

## 2020-07-09 ENCOUNTER — NON-APPOINTMENT (OUTPATIENT)
Age: 75
End: 2020-07-09

## 2020-07-09 VITALS
WEIGHT: 142 LBS | HEART RATE: 67 BPM | BODY MASS INDEX: 26.81 KG/M2 | TEMPERATURE: 98.3 F | OXYGEN SATURATION: 97 % | SYSTOLIC BLOOD PRESSURE: 134 MMHG | HEIGHT: 61 IN | DIASTOLIC BLOOD PRESSURE: 77 MMHG | RESPIRATION RATE: 12 BRPM

## 2020-07-09 PROCEDURE — 93000 ELECTROCARDIOGRAM COMPLETE: CPT

## 2020-07-09 PROCEDURE — 99213 OFFICE O/P EST LOW 20 MIN: CPT | Mod: 25

## 2020-07-09 PROCEDURE — 99397 PER PM REEVAL EST PAT 65+ YR: CPT

## 2020-07-09 PROCEDURE — 93306 TTE W/DOPPLER COMPLETE: CPT

## 2020-07-09 NOTE — ASSESSMENT
[FreeTextEntry1] : Physical\par referred for mammography\par She is UTD with her colonoscopy and Bone density scan\par Does not want pneumonia or shingles vaccine at this time\par blood work ordered\par \par  hypercholesterolemia, hypothyroid, SVT\par cont current meds\par follows with cardiology\par \par follow up in one week for lab results\par

## 2020-07-09 NOTE — PHYSICAL EXAM
[No Acute Distress] : no acute distress [Well-Appearing] : well-appearing [Well Developed] : well developed [Well Nourished] : well nourished [PERRL] : pupils equal round and reactive to light [Normal Sclera/Conjunctiva] : normal sclera/conjunctiva [EOMI] : extraocular movements intact [Normal Oropharynx] : the oropharynx was normal [Normal Outer Ear/Nose] : the outer ears and nose were normal in appearance [No JVD] : no jugular venous distention [No Lymphadenopathy] : no lymphadenopathy [Supple] : supple [Thyroid Normal, No Nodules] : the thyroid was normal and there were no nodules present [No Respiratory Distress] : no respiratory distress  [Clear to Auscultation] : lungs were clear to auscultation bilaterally [No Accessory Muscle Use] : no accessory muscle use [Regular Rhythm] : with a regular rhythm [Normal Rate] : normal rate  [Normal S1, S2] : normal S1 and S2 [No Murmur] : no murmur heard [Pedal Pulses Present] : the pedal pulses are present [No Edema] : there was no peripheral edema [No Extremity Clubbing/Cyanosis] : no extremity clubbing/cyanosis [Soft] : abdomen soft [Non-distended] : non-distended [Non Tender] : non-tender [No HSM] : no HSM [No Masses] : no abdominal mass palpated [Normal Bowel Sounds] : normal bowel sounds [Normal Posterior Cervical Nodes] : no posterior cervical lymphadenopathy [Normal Anterior Cervical Nodes] : no anterior cervical lymphadenopathy [No CVA Tenderness] : no CVA  tenderness [No Joint Swelling] : no joint swelling [No Spinal Tenderness] : no spinal tenderness [Grossly Normal Strength/Tone] : grossly normal strength/tone [No Rash] : no rash [Coordination Grossly Intact] : coordination grossly intact [No Focal Deficits] : no focal deficits [Normal Gait] : normal gait [Speech Grossly Normal] : speech grossly normal [Normal Affect] : the affect was normal [Alert and Oriented x3] : oriented to person, place, and time [Normal Insight/Judgement] : insight and judgment were intact

## 2020-07-09 NOTE — HEALTH RISK ASSESSMENT
[No] : No [] :  [Fully functional (bathing, dressing, toileting, transferring, walking, feeding)] : Fully functional (bathing, dressing, toileting, transferring, walking, feeding) [# Of Children ___] : has [unfilled] children [Fully functional (using the telephone, shopping, preparing meals, housekeeping, doing laundry, using] : Fully functional and needs no help or supervision to perform IADLs (using the telephone, shopping, preparing meals, housekeeping, doing laundry, using transportation, managing medications and managing finances) [] : No [MammogramDate] : April 2019 [PapSmearDate] : > one year [BoneDensityDate] : April 2019 [ColonoscopyDate] : 2017 [de-identified] : with daughter

## 2020-07-09 NOTE — HISTORY OF PRESENT ILLNESS
[de-identified] : Ms. AHMET JEROME is a 75 year old female, accompanied by her daughter, with PMHx of hypercholesterolemia, hypothyroid, SVT presents for annual physical\par She is doing well.  Reports compliance with taking her meds daily. \par She follows with cardiology every 6 months\par Denies SOB, chest pain, abdominal pain, N/V/D, leg swelling, headache, dizziness \par Offers no complaints\par

## 2020-07-14 LAB
ALBUMIN SERPL ELPH-MCNC: 4.6 G/DL
ALP BLD-CCNC: 70 U/L
ALT SERPL-CCNC: 16 U/L
ANION GAP SERPL CALC-SCNC: 14 MMOL/L
AST SERPL-CCNC: 23 U/L
BILIRUB SERPL-MCNC: 0.4 MG/DL
BUN SERPL-MCNC: 16 MG/DL
CALCIUM SERPL-MCNC: 9.6 MG/DL
CHLORIDE SERPL-SCNC: 100 MMOL/L
CHOLEST SERPL-MCNC: 196 MG/DL
CHOLEST/HDLC SERPL: 2.8 RATIO
CO2 SERPL-SCNC: 28 MMOL/L
CREAT SERPL-MCNC: 1.02 MG/DL
ESTIMATED AVERAGE GLUCOSE: 103 MG/DL
GLUCOSE SERPL-MCNC: 89 MG/DL
HBA1C MFR BLD HPLC: 5.2 %
HDLC SERPL-MCNC: 70 MG/DL
LDLC SERPL CALC-MCNC: 98 MG/DL
POTASSIUM SERPL-SCNC: 4.9 MMOL/L
PROT SERPL-MCNC: 7.2 G/DL
SARS-COV-2 IGG SERPL IA-ACNC: 0.01 INDEX
SARS-COV-2 IGG SERPL QL IA: NEGATIVE
SODIUM SERPL-SCNC: 142 MMOL/L
TRIGL SERPL-MCNC: 139 MG/DL
TSH SERPL-ACNC: 0.63 UIU/ML
VIT B12 SERPL-MCNC: 916 PG/ML

## 2020-07-16 LAB — 25(OH)D3 SERPL-MCNC: 51.8 NG/ML

## 2020-07-21 NOTE — HISTORY OF PRESENT ILLNESS
[FreeTextEntry1] : Patient is a 75 year old woman with a history of hyperlipidemia, hypothyroidism, mitral regurgitation, SVT, and APCs who presents today for follow up of her rhythm disorder and hyperlipidemia. She has been feeling well, denies any palpitations, headaches, dizziness, chest pain or dyspnea.

## 2020-07-21 NOTE — DISCUSSION/SUMMARY
[FreeTextEntry1] : IMPRESSION: Ms. Dill is a 75 year old woman with a history of hyperlipidemia, mitral regurgitation, hypothyroidism, SVT, and APCs who presents today for follow up of rhythm disorder and hyperlipidemia.\par \par PLAN:\par 1. There was no ectopy on exam or her ECG, thus she will continue on Toprol XL 37.5mg daily.\par 2. She will continue on Lipitor 10mg every other day along with diet modification. She will be having a fasting lipid panel and CMP checked with you today.\par 3. Her echocardiogram done today showed unchanged mitral regurgitation and normal LV function.\par 4. She will follow up with me in 6 months or sooner should she experience any symptoms in the interim.

## 2020-07-21 NOTE — PHYSICAL EXAM
[Normal Appearance] : normal appearance [General Appearance - Well Developed] : well developed [Well Groomed] : well groomed [No Deformities] : no deformities [General Appearance - Well Nourished] : well nourished [General Appearance - In No Acute Distress] : no acute distress [Normal Conjunctiva] : the conjunctiva exhibited no abnormalities [Normal Oral Mucosa] : normal oral mucosa [No Oral Pallor] : no oral pallor [Normal Jugular Venous A Waves Present] : normal jugular venous A waves present [No Oral Cyanosis] : no oral cyanosis [Normal Jugular Venous V Waves Present] : normal jugular venous V waves present [Respiration, Rhythm And Depth] : normal respiratory rhythm and effort [Exaggerated Use Of Accessory Muscles For Inspiration] : no accessory muscle use [Auscultation Breath Sounds / Voice Sounds] : lungs were clear to auscultation bilaterally [Normal Rate] : normal [Rhythm Regular] : regular [Normal S1] : normal S1 [Normal S2] : normal S2 [No Pitting Edema] : no pitting edema present [Abdomen Soft] : soft [Abdomen Tenderness] : non-tender [Abnormal Walk] : normal gait [Gait - Sufficient For Exercise Testing] : the gait was sufficient for exercise testing [Nail Clubbing] : no clubbing of the fingernails [Cyanosis, Localized] : no localized cyanosis [Petechial Hemorrhages (___cm)] : no petechial hemorrhages [Skin Color & Pigmentation] : normal skin color and pigmentation [] : no rash [Mood] : the mood was normal [Oriented To Time, Place, And Person] : oriented to person, place, and time [Affect] : the affect was normal [No Anxiety] : not feeling anxious [II] : a grade 2 [I] : a grade 1 [Bowel Sounds] : normal bowel sounds [No Skin Ulcers] : no skin ulcer [FreeTextEntry1] : Extraocular muscles intact. Anicteric sclerae.  [S3] : no S3 [Left Carotid Bruit] : no bruit heard over the left carotid [Right Carotid Bruit] : no bruit heard over the right carotid [Bruit] : no bruit heard

## 2020-07-23 ENCOUNTER — TRANSCRIPTION ENCOUNTER (OUTPATIENT)
Age: 75
End: 2020-07-23

## 2020-07-24 LAB
APPEARANCE: CLEAR
BASOPHILS # BLD AUTO: 0.04 K/UL
BASOPHILS NFR BLD AUTO: 0.7 %
BILIRUBIN URINE: NEGATIVE
BLOOD URINE: NEGATIVE
COLOR: YELLOW
EOSINOPHIL # BLD AUTO: 0.1 K/UL
EOSINOPHIL NFR BLD AUTO: 1.7 %
GLUCOSE QUALITATIVE U: NEGATIVE
HCT VFR BLD CALC: 42.2 %
HGB BLD-MCNC: 13.1 G/DL
IMM GRANULOCYTES NFR BLD AUTO: 0.2 %
KETONES URINE: NEGATIVE
LEUKOCYTE ESTERASE URINE: NEGATIVE
LYMPHOCYTES # BLD AUTO: 1.95 K/UL
LYMPHOCYTES NFR BLD AUTO: 33.5 %
MAN DIFF?: NORMAL
MCHC RBC-ENTMCNC: 29.3 PG
MCHC RBC-ENTMCNC: 31 GM/DL
MCV RBC AUTO: 94.4 FL
MONOCYTES # BLD AUTO: 0.52 K/UL
MONOCYTES NFR BLD AUTO: 8.9 %
NEUTROPHILS # BLD AUTO: 3.2 K/UL
NEUTROPHILS NFR BLD AUTO: 55 %
NITRITE URINE: NEGATIVE
PH URINE: 6.5
PLATELET # BLD AUTO: 229 K/UL
PROTEIN URINE: NORMAL
RBC # BLD: 4.47 M/UL
RBC # FLD: 12.6 %
SPECIFIC GRAVITY URINE: 1.02
UROBILINOGEN URINE: NORMAL
WBC # FLD AUTO: 5.82 K/UL

## 2020-10-07 ENCOUNTER — APPOINTMENT (OUTPATIENT)
Dept: ENDOCRINOLOGY | Facility: CLINIC | Age: 75
End: 2020-10-07
Payer: MEDICAID

## 2020-10-07 DIAGNOSIS — E55.9 VITAMIN D DEFICIENCY, UNSPECIFIED: ICD-10-CM

## 2020-10-07 PROCEDURE — 99214 OFFICE O/P EST MOD 30 MIN: CPT | Mod: 95

## 2020-10-07 NOTE — PHYSICAL EXAM
[Alert] : alert [Well Nourished] : well nourished [Healthy Appearance] : healthy appearance [No Acute Distress] : no acute distress

## 2020-10-07 NOTE — ASSESSMENT
[FreeTextEntry1] : 75 y.o. woman w/ hypothyroidism and HLD:\par -Hypothyroidism: Clinically euthyroid on  LT4  75mcg PO daily. TFT;s WNL 7/2020. Cont. LT4 @ current dose.\par \par -Osteoporosis: She is on alendronate. Last DEXA 4/2019. Cont. Ca and Vit D supplementation.\par \par -Vit d def: cont. Vit D supplementation. Vit d level 7/2020.\par \par RTC 1 year

## 2020-10-07 NOTE — HISTORY OF PRESENT ILLNESS
[FreeTextEntry1] : 75 y.o. woman w/ hypothyroidism and HLD here for f/u. She was last seen clinic 2018.\par \par This visit was provided via telehealth using real-time 2-way audio visual technology. The patient was located at home at the time of the visit. \par The provider  was located at the medical office located in  at the time of the visit. The patient and Provider participated in the telehealth encounter. \par Verbal consent given by the patient.\par \par Last visit she was clinically euthyroid on  LT4  75mcg PO qam. \par \par She has been feeling good w/ no complaints. No fatigue/ weight changes/ bowel changes. No tremors or palpitations. No hair loss or dry skin. No trouble sleeping.\par \par Also w/ h/o osteoporosis, on Fosamax since  2016. Last DEXA 4/2019.\par \par TFT's WNL 7/2020.\par \par

## 2020-10-21 ENCOUNTER — APPOINTMENT (OUTPATIENT)
Dept: INTERNAL MEDICINE | Facility: CLINIC | Age: 75
End: 2020-10-21
Payer: MEDICAID

## 2020-10-21 PROCEDURE — 99214 OFFICE O/P EST MOD 30 MIN: CPT | Mod: 95

## 2020-10-22 NOTE — HISTORY OF PRESENT ILLNESS
[Home] : at home, [unfilled] , at the time of the visit. [Medical Office: ([facility])___] : at the medical office located in  [Other:____] : [unfilled] [Verbal consent obtained from patient] : the patient, [unfilled] [FreeTextEntry3] : and her son [de-identified] : Ms. AHMET JEROME is a 75 year old female, with hx of hypothyroid, SVT, hypercholesterolemia, osteoporosis, seen in telemedicine follow up visit for Rx renewals \par Pt's son was also present and translating for pt\par She is doing well. \par Denies SOB, chest pain, abdominal pain, N/V/D, leg swelling, headache, dizziness \par Offers no complaints\par \par \par

## 2020-10-22 NOTE — PHYSICAL EXAM
[Normal] : no acute distress, well nourished, well developed and well-appearing [No Respiratory Distress] : no respiratory distress  [Speech Grossly Normal] : speech grossly normal [Normal Affect] : the affect was normal [Alert and Oriented x3] : oriented to person, place, and time [Normal Insight/Judgement] : insight and judgment were intact

## 2020-12-16 PROBLEM — J06.9 VIRAL URI WITH COUGH: Status: RESOLVED | Noted: 2018-06-22 | Resolved: 2020-12-16

## 2020-12-16 PROBLEM — Z87.09 HISTORY OF ACUTE BRONCHITIS WITH BRONCHOSPASM: Status: RESOLVED | Noted: 2018-06-22 | Resolved: 2020-12-16

## 2021-01-05 ENCOUNTER — APPOINTMENT (OUTPATIENT)
Dept: CARDIOLOGY | Facility: CLINIC | Age: 76
End: 2021-01-05
Payer: MEDICAID

## 2021-01-05 ENCOUNTER — NON-APPOINTMENT (OUTPATIENT)
Age: 76
End: 2021-01-05

## 2021-01-05 VITALS
BODY MASS INDEX: 26.81 KG/M2 | HEIGHT: 61 IN | WEIGHT: 142 LBS | TEMPERATURE: 97.1 F | OXYGEN SATURATION: 96 % | DIASTOLIC BLOOD PRESSURE: 75 MMHG | RESPIRATION RATE: 12 BRPM | HEART RATE: 83 BPM | SYSTOLIC BLOOD PRESSURE: 126 MMHG

## 2021-01-05 PROCEDURE — 93000 ELECTROCARDIOGRAM COMPLETE: CPT

## 2021-01-05 PROCEDURE — 99214 OFFICE O/P EST MOD 30 MIN: CPT | Mod: 25

## 2021-01-05 PROCEDURE — 99072 ADDL SUPL MATRL&STAF TM PHE: CPT

## 2021-01-05 RX ORDER — OSELTAMIVIR PHOSPHATE 75 MG/1
75 CAPSULE ORAL TWICE DAILY
Qty: 1 | Refills: 0 | Status: DISCONTINUED | COMMUNITY
Start: 2018-06-22 | End: 2021-01-05

## 2021-01-10 LAB
25(OH)D3 SERPL-MCNC: 53.1 NG/ML
ALBUMIN SERPL ELPH-MCNC: 4.5 G/DL
ALP BLD-CCNC: 83 U/L
ALT SERPL-CCNC: 18 U/L
ANION GAP SERPL CALC-SCNC: 14 MMOL/L
AST SERPL-CCNC: 23 U/L
BASOPHILS # BLD AUTO: 0.05 K/UL
BASOPHILS NFR BLD AUTO: 1 %
BILIRUB SERPL-MCNC: 0.4 MG/DL
BUN SERPL-MCNC: 14 MG/DL
CALCIUM SERPL-MCNC: 9.3 MG/DL
CHLORIDE SERPL-SCNC: 102 MMOL/L
CHOLEST SERPL-MCNC: 190 MG/DL
CO2 SERPL-SCNC: 26 MMOL/L
CREAT SERPL-MCNC: 1.12 MG/DL
EOSINOPHIL # BLD AUTO: 0.06 K/UL
EOSINOPHIL NFR BLD AUTO: 1.2 %
ESTIMATED AVERAGE GLUCOSE: 103 MG/DL
GLUCOSE SERPL-MCNC: 83 MG/DL
HBA1C MFR BLD HPLC: 5.2 %
HCT VFR BLD CALC: 43.6 %
HDLC SERPL-MCNC: 63 MG/DL
HGB BLD-MCNC: 13.8 G/DL
IMM GRANULOCYTES NFR BLD AUTO: 0.2 %
LDLC SERPL CALC-MCNC: 101 MG/DL
LYMPHOCYTES # BLD AUTO: 1.54 K/UL
LYMPHOCYTES NFR BLD AUTO: 29.6 %
MAN DIFF?: NORMAL
MCHC RBC-ENTMCNC: 29.9 PG
MCHC RBC-ENTMCNC: 31.7 GM/DL
MCV RBC AUTO: 94.6 FL
MONOCYTES # BLD AUTO: 0.38 K/UL
MONOCYTES NFR BLD AUTO: 7.3 %
NEUTROPHILS # BLD AUTO: 3.16 K/UL
NEUTROPHILS NFR BLD AUTO: 60.7 %
NONHDLC SERPL-MCNC: 127 MG/DL
PLATELET # BLD AUTO: 233 K/UL
POTASSIUM SERPL-SCNC: 4.5 MMOL/L
PROT SERPL-MCNC: 7 G/DL
RBC # BLD: 4.61 M/UL
RBC # FLD: 12.7 %
SODIUM SERPL-SCNC: 142 MMOL/L
TRIGL SERPL-MCNC: 126 MG/DL
TSH SERPL-ACNC: 0.89 UIU/ML
WBC # FLD AUTO: 5.2 K/UL

## 2021-01-10 NOTE — HISTORY OF PRESENT ILLNESS
[FreeTextEntry1] : Patient is a 75 year old woman with a history of hyperlipidemia, hypothyroidism, mitral regurgitation, SVT, and APCs who presents today for follow up of her rhythm disorder and hyperlipidemia. About two weeks ago she had a mild, sharp pain across her chest while she was laying down, and she took a deep breath and it went away. She had no associated symptoms. She has had no further episodes. She has otherwise been feeling very well, denying any palpitations, headaches, dizziness, exertional chest pain or dyspnea.

## 2021-01-10 NOTE — DISCUSSION/SUMMARY
[FreeTextEntry1] : IMPRESSION: Ms. Dill is a 75 year old woman with a history of hyperlipidemia, mitral regurgitation, hypothyroidism, SVT, and APCs who presents today for follow up of rhythm disorder and hyperlipidemia.\par \par PLAN:\par 1. Her chest pain was atypical and she has had no further episodes. If she has any more episodes, then she will let us know. Her echocardiogram about six months ago showed unchanged mitral regurgitation and normal LV function. Given that her chest pain was atypical and had one episode, and since we are in the middle of the COVID pandemic, we will hold off on a stress test at this time.\par 2. There was no ectopy on exam or her ECG, thus she will continue on Toprol XL 37.5mg daily.\par 3. She will continue on Lipitor 10mg every other day along with diet modification. I will be checking a fasting lipid panel and CMP today.\par 4. She will follow up with me in 6 months or sooner should she experience any symptoms in the interim.\par 5. We spent approximately 30 minutes with the patient and her daughter and more than 50% of the time was spent discussing her atypical chest pain.

## 2021-01-10 NOTE — PHYSICAL EXAM
[General Appearance - Well Developed] : well developed [Normal Appearance] : normal appearance [Well Groomed] : well groomed [General Appearance - Well Nourished] : well nourished [No Deformities] : no deformities [General Appearance - In No Acute Distress] : no acute distress [Normal Conjunctiva] : the conjunctiva exhibited no abnormalities [Normal Jugular Venous A Waves Present] : normal jugular venous A waves present [Normal Jugular Venous V Waves Present] : normal jugular venous V waves present [Respiration, Rhythm And Depth] : normal respiratory rhythm and effort [Exaggerated Use Of Accessory Muscles For Inspiration] : no accessory muscle use [Auscultation Breath Sounds / Voice Sounds] : lungs were clear to auscultation bilaterally [Normal Rate] : normal [Rhythm Regular] : regular [Normal S1] : normal S1 [Normal S2] : normal S2 [No Pitting Edema] : no pitting edema present [Abdomen Soft] : soft [Abdomen Tenderness] : non-tender [Abnormal Walk] : normal gait [Gait - Sufficient For Exercise Testing] : the gait was sufficient for exercise testing [Nail Clubbing] : no clubbing of the fingernails [Cyanosis, Localized] : no localized cyanosis [Petechial Hemorrhages (___cm)] : no petechial hemorrhages [Skin Color & Pigmentation] : normal skin color and pigmentation [] : no rash [Oriented To Time, Place, And Person] : oriented to person, place, and time [Affect] : the affect was normal [Mood] : the mood was normal [No Anxiety] : not feeling anxious [FreeTextEntry1] : She was wearing a face mask during the examination.  [S3] : no S3 [II] : a grade 2 [I] : a grade 1 [Right Carotid Bruit] : no bruit heard over the right carotid [Bruit] : no bruit heard [Left Carotid Bruit] : no bruit heard over the left carotid [Bowel Sounds] : normal bowel sounds [No Skin Ulcers] : no skin ulcer

## 2021-05-06 ENCOUNTER — APPOINTMENT (OUTPATIENT)
Dept: INTERNAL MEDICINE | Facility: CLINIC | Age: 76
End: 2021-05-06
Payer: MEDICARE

## 2021-05-06 VITALS
WEIGHT: 145 LBS | OXYGEN SATURATION: 98 % | RESPIRATION RATE: 12 BRPM | HEART RATE: 86 BPM | DIASTOLIC BLOOD PRESSURE: 77 MMHG | BODY MASS INDEX: 27.38 KG/M2 | TEMPERATURE: 97.3 F | HEIGHT: 61 IN | SYSTOLIC BLOOD PRESSURE: 144 MMHG

## 2021-05-06 DIAGNOSIS — Z23 ENCOUNTER FOR IMMUNIZATION: ICD-10-CM

## 2021-05-06 PROCEDURE — 99214 OFFICE O/P EST MOD 30 MIN: CPT

## 2021-05-06 PROCEDURE — 99072 ADDL SUPL MATRL&STAF TM PHE: CPT

## 2021-05-09 NOTE — ASSESSMENT
[FreeTextEntry1] :  \par \par Hypothyroid\par cont Synthroid 75mcg-renewed today\par \par Hx of SVT\par cont metoprolol 25mg one and a 1/2 tab daily-renewed today\par follows with cardiology\par \par hypercholesterolemia\par cont Atorvastatin 10mg daily-renewed today\par \par Osteoporosis\par cont Flomax 70mg weekly-renewed  today\par cont Vit D and calcium\par \par right forearm pain-improved\par tylenol prn\par \par pt does not want blood work today

## 2021-05-09 NOTE — PHYSICAL EXAM
[No Acute Distress] : no acute distress [Well Nourished] : well nourished [Well Developed] : well developed [Well-Appearing] : well-appearing [Normal Sclera/Conjunctiva] : normal sclera/conjunctiva [PERRL] : pupils equal round and reactive to light [Normal Outer Ear/Nose] : the outer ears and nose were normal in appearance [Normal Oropharynx] : the oropharynx was normal [No JVD] : no jugular venous distention [No Lymphadenopathy] : no lymphadenopathy [Supple] : supple [No Respiratory Distress] : no respiratory distress  [No Accessory Muscle Use] : no accessory muscle use [Clear to Auscultation] : lungs were clear to auscultation bilaterally [Normal Rate] : normal rate  [Regular Rhythm] : with a regular rhythm [Normal S1, S2] : normal S1 and S2 [Pedal Pulses Present] : the pedal pulses are present [No Edema] : there was no peripheral edema [No Extremity Clubbing/Cyanosis] : no extremity clubbing/cyanosis [Soft] : abdomen soft [Non Tender] : non-tender [Non-distended] : non-distended [No Masses] : no abdominal mass palpated [No HSM] : no HSM [Normal Bowel Sounds] : normal bowel sounds [Normal Anterior Cervical Nodes] : no anterior cervical lymphadenopathy [No CVA Tenderness] : no CVA  tenderness [No Spinal Tenderness] : no spinal tenderness [No Joint Swelling] : no joint swelling [Grossly Normal Strength/Tone] : grossly normal strength/tone [No Rash] : no rash [Coordination Grossly Intact] : coordination grossly intact [No Focal Deficits] : no focal deficits [Normal Gait] : normal gait [Speech Grossly Normal] : speech grossly normal [Normal Affect] : the affect was normal [Alert and Oriented x3] : oriented to person, place, and time [Normal Insight/Judgement] : insight and judgment were intact [de-identified] : + II/VI murmur

## 2021-05-09 NOTE — HISTORY OF PRESENT ILLNESS
[de-identified] : Ms. AHMET JEROME is a 76 year old female, accompanied by her daughter, with hx of hypothyroid, SVT, hypercholesterolemia, osteoporosis, presents for follow up visit, Rx renewals\par Pt states that she has pain in her right forearm for 3 weeks. She took tylenol and was using hot and cold compress but nothing helped\par She started using massager the past week which she says helped\par Denies numbness/tingling. Says yesterday and today the pain is a lot better\par Denies SOB, chest pain, abdominal pain, N/V/D, leg swelling, headache, dizziness \par \par \par \par

## 2021-06-21 ENCOUNTER — APPOINTMENT (OUTPATIENT)
Dept: INTERNAL MEDICINE | Facility: CLINIC | Age: 76
End: 2021-06-21
Payer: MEDICARE

## 2021-06-21 VITALS
TEMPERATURE: 96.8 F | WEIGHT: 143 LBS | SYSTOLIC BLOOD PRESSURE: 125 MMHG | OXYGEN SATURATION: 95 % | DIASTOLIC BLOOD PRESSURE: 74 MMHG | HEIGHT: 61 IN | BODY MASS INDEX: 27 KG/M2 | HEART RATE: 85 BPM | RESPIRATION RATE: 12 BRPM

## 2021-06-21 PROCEDURE — 99214 OFFICE O/P EST MOD 30 MIN: CPT

## 2021-06-27 NOTE — HISTORY OF PRESENT ILLNESS
[Family Member] : family member [FreeTextEntry8] : Ms. Dill is a 76 year old female, with hx of SVT, hypothyroid, hypercholesterolemia, osteoporosis,  who is accompanied by her daughter, presents to the office today for left leg pain which radiates to the great toe on the left foot.  She reports historic, episodic sciatic pain which started 2 years ago which has worsened over the last 2 months.  The pain today is rated 2/10 but reaches a level of 8/10, is exacerbated with ambulation and resolves when she sits or lies down.  She currently takes gabapentin 300 mg oral daily (Rx from orthopedist) and denies taking any NSAID's for the pain. She denies falls, cp, sob, lara, n/v/d or constipation.

## 2021-06-27 NOTE — ASSESSMENT
[FreeTextEntry1] : \par Left Sciatic Pain radiating to left lower extremity\par Continue Gabapentin 300 mg oral daily\par Start Medrol Dose pack \par Referral provided for Physical Therapy\par \par Hx of Hypothyroid\par cont Synthroid 75mcg\par \par Hx of SVT\par cont metoprolol 25mg one and a 1/2 tab daily\par follows with cardiology\par \par hypercholesterolemia\par cont Atorvastatin 10mg daily\par \par Osteoporosis\par cont Flomax 70mg weekly\par cont Vit D and calcium\par

## 2021-06-27 NOTE — PHYSICAL EXAM
[No Acute Distress] : no acute distress [Well Nourished] : well nourished [Well Developed] : well developed [Well-Appearing] : well-appearing [Normal Sclera/Conjunctiva] : normal sclera/conjunctiva [PERRL] : pupils equal round and reactive to light [Normal Outer Ear/Nose] : the outer ears and nose were normal in appearance [Normal Oropharynx] : the oropharynx was normal [No JVD] : no jugular venous distention [No Lymphadenopathy] : no lymphadenopathy [Supple] : supple [No Respiratory Distress] : no respiratory distress  [No Accessory Muscle Use] : no accessory muscle use [Clear to Auscultation] : lungs were clear to auscultation bilaterally [Normal Rate] : normal rate  [Regular Rhythm] : with a regular rhythm [Normal S1, S2] : normal S1 and S2 [Pedal Pulses Present] : the pedal pulses are present [No Edema] : there was no peripheral edema [No Extremity Clubbing/Cyanosis] : no extremity clubbing/cyanosis [Soft] : abdomen soft [Non Tender] : non-tender [Non-distended] : non-distended [No Masses] : no abdominal mass palpated [No HSM] : no HSM [Normal Bowel Sounds] : normal bowel sounds [Normal Anterior Cervical Nodes] : no anterior cervical lymphadenopathy [No CVA Tenderness] : no CVA  tenderness [No Spinal Tenderness] : no spinal tenderness [No Joint Swelling] : no joint swelling [Grossly Normal Strength/Tone] : grossly normal strength/tone [No Rash] : no rash [Coordination Grossly Intact] : coordination grossly intact [No Focal Deficits] : no focal deficits [Normal Gait] : normal gait [Speech Grossly Normal] : speech grossly normal [Normal Affect] : the affect was normal [Alert and Oriented x3] : oriented to person, place, and time [Normal Insight/Judgement] : insight and judgment were intact [de-identified] : + II/VI murmur [de-identified] : see HPI, strength 5/5 to B/L LE [de-identified] : see HPI-sciatic induced left lower extremity neuropathic pain, heel to -toe walking notmal

## 2021-07-06 ENCOUNTER — APPOINTMENT (OUTPATIENT)
Dept: CARDIOLOGY | Facility: CLINIC | Age: 76
End: 2021-07-06

## 2021-07-29 ENCOUNTER — APPOINTMENT (OUTPATIENT)
Dept: INTERNAL MEDICINE | Facility: CLINIC | Age: 76
End: 2021-07-29
Payer: MEDICARE

## 2021-07-29 VITALS
DIASTOLIC BLOOD PRESSURE: 75 MMHG | HEART RATE: 86 BPM | OXYGEN SATURATION: 98 % | SYSTOLIC BLOOD PRESSURE: 120 MMHG | RESPIRATION RATE: 14 BRPM | TEMPERATURE: 96.6 F

## 2021-07-29 PROCEDURE — 99214 OFFICE O/P EST MOD 30 MIN: CPT

## 2021-08-11 NOTE — PHYSICAL EXAM
[No Acute Distress] : no acute distress [Well Nourished] : well nourished [Well Developed] : well developed [Well-Appearing] : well-appearing [Normal Sclera/Conjunctiva] : normal sclera/conjunctiva [PERRL] : pupils equal round and reactive to light [EOMI] : extraocular movements intact [Normal Outer Ear/Nose] : the outer ears and nose were normal in appearance [Normal Oropharynx] : the oropharynx was normal [No JVD] : no jugular venous distention [Supple] : supple [No Respiratory Distress] : no respiratory distress  [No Accessory Muscle Use] : no accessory muscle use [Clear to Auscultation] : lungs were clear to auscultation bilaterally [Normal Rate] : normal rate  [Regular Rhythm] : with a regular rhythm [Normal S1, S2] : normal S1 and S2 [No Edema] : there was no peripheral edema [Soft] : abdomen soft [Non Tender] : non-tender [Normal Posterior Cervical Nodes] : no posterior cervical lymphadenopathy [Normal Anterior Cervical Nodes] : no anterior cervical lymphadenopathy [No CVA Tenderness] : no CVA  tenderness [Grossly Normal Strength/Tone] : grossly normal strength/tone [No Rash] : no rash [Coordination Grossly Intact] : coordination grossly intact [No Focal Deficits] : no focal deficits [Normal Gait] : normal gait [Deep Tendon Reflexes (DTR)] : deep tendon reflexes were 2+ and symmetric [Normal Affect] : the affect was normal [Normal Insight/Judgement] : insight and judgment were intact [de-identified] : + lower back/leg pain with walking.

## 2021-08-11 NOTE — ASSESSMENT
[FreeTextEntry1] : \par left sciatica\par does not want steroids-says they made her face "puffy" last time\par Flexeril 10mg\par Mobic 7.5mg daily with food\par Percocet 5/325mg q6 hrs prn\par referred for MRI\par referred to spine ortho

## 2021-08-11 NOTE — HISTORY OF PRESENT ILLNESS
[de-identified] : \par Ms. AHMET JEROME is a 76 year old female , with hx of SVT, hypothyroid, hypercholesterolemia, osteoporosis, who is accompanied by her daughter, presents an acute visit with worsening left-sided lumbar radiculopathy. Over the past two weeks she has had severe left sided back pain radiating down her left leg. The pain is worse when she is walking and improves when she continues walking. She had a Medrol dose pack previously that she states didn't help much with her pain. She has been taking Gabapentin BID, however she has been experiencing dizziness with it. She has been taking Tylenol for the past 5 days with minimal relief. She is starting physical therapy next week. She has numbness of the toes on her left foot.

## 2021-08-16 ENCOUNTER — APPOINTMENT (OUTPATIENT)
Dept: ORTHOPEDIC SURGERY | Facility: CLINIC | Age: 76
End: 2021-08-16
Payer: MEDICARE

## 2021-08-16 DIAGNOSIS — M54.16 RADICULOPATHY, LUMBAR REGION: ICD-10-CM

## 2021-08-16 DIAGNOSIS — M48.07 SPINAL STENOSIS, LUMBOSACRAL REGION: ICD-10-CM

## 2021-08-16 PROCEDURE — 99204 OFFICE O/P NEW MOD 45 MIN: CPT

## 2021-08-16 NOTE — PHYSICAL EXAM
[Antalgic] : antalgic [de-identified] : Examination of the lumbar spine reveals no midline tenderness palpation, step-offs, or skin lesions. Decreased range of motion with respect to flexion, extension, lateral bending, and rotation. No tenderness to palpation of the sciatic notch. No tenderness palpation of the bilateral greater trochanters. No pain with passive internal/external rotation of the hips. No instability of bilateral lower extremities.  Negative ANIBAL. Negative straight leg raise bilaterally. No bowstring. Negative femoral stretch. 5 out of 5 iliopsoas, hip abductors, hips adductors, quadriceps, hamstrings, gastrocsoleus, tibialis anterior, extensor hallucis longus, peroneals. Grossly intact sensation to light touch bilateral lower extremities. 1+ patellar and Achilles reflexes. Downgoing Babinski. No clonus. Intact proprioception. Palpable pulses. No skin lesion and no edema on the right and left lower extremities. [de-identified] : Review of her lumbar spine MRI reveals moderate stenosis at L3-4.  She has increased stenosis at L4-5 as well as severe left-sided foraminal stenosis at this level.

## 2021-08-16 NOTE — HISTORY OF PRESENT ILLNESS
[de-identified] : Ms. AHMET JEROME  is a 76 year old female who presents with 6 weeks of left lumbar radiculopathy.  Her pain is from her left lumbar that radiates down into her buttock, lateral thigh, shin, and under her foot.  She took a medrol dose pack without any relief.  She did not have a good reaction to it.  Normal bowel and bladder control.   Denies any recent fevers, chills, sweats, weight loss, or infection.  She has had intermittent pain for the past 2 years. \par \par The patients past medical history, past surgical history, medications, allergies, and social history were reviewed by me today with the patient and documented accordingly.  In addition, the patient's family history, which is noncontributory to their visit, was also reviewed.\par

## 2021-08-16 NOTE — DISCUSSION/SUMMARY
[de-identified] : We discussed further treatment options.  She has tried medications and will be engaging in some physical therapy.  She is also scheduled for an epidural injection tomorrow.  We did discuss the role of surgery.  This would involve a lumbar decompression from L3-5 and possible fusion at the L4-5 level given the severe left-sided foraminal stenosis.  She will follow-up after her injection or sooner with any changes or worsening of her symptoms.

## 2021-09-28 ENCOUNTER — NON-APPOINTMENT (OUTPATIENT)
Age: 76
End: 2021-09-28

## 2021-09-28 ENCOUNTER — APPOINTMENT (OUTPATIENT)
Dept: CARDIOLOGY | Facility: CLINIC | Age: 76
End: 2021-09-28
Payer: MEDICARE

## 2021-09-28 VITALS
OXYGEN SATURATION: 98 % | BODY MASS INDEX: 26.64 KG/M2 | RESPIRATION RATE: 12 BRPM | HEART RATE: 78 BPM | SYSTOLIC BLOOD PRESSURE: 128 MMHG | WEIGHT: 141 LBS | DIASTOLIC BLOOD PRESSURE: 77 MMHG | TEMPERATURE: 96.7 F

## 2021-09-28 PROCEDURE — 99214 OFFICE O/P EST MOD 30 MIN: CPT | Mod: 25

## 2021-09-28 PROCEDURE — G0008: CPT

## 2021-09-28 PROCEDURE — 90662 IIV NO PRSV INCREASED AG IM: CPT

## 2021-09-28 PROCEDURE — 93000 ELECTROCARDIOGRAM COMPLETE: CPT

## 2021-10-04 NOTE — HISTORY OF PRESENT ILLNESS
[FreeTextEntry1] : Patient is a 76 year old woman with a history of hyperlipidemia, hypothyroidism, mitral regurgitation, SVT, and APCs who presents today for follow up of her rhythm disorder and hyperlipidemia. She states that she has been feeling well denying any palpitations, headaches, dizziness, exertional chest pain, or dyspnea.

## 2021-10-04 NOTE — PHYSICAL EXAM
[General Appearance - Well Developed] : well developed [Normal Appearance] : normal appearance [Well Groomed] : well groomed [General Appearance - Well Nourished] : well nourished [No Deformities] : no deformities [General Appearance - In No Acute Distress] : no acute distress [Normal Conjunctiva] : the conjunctiva exhibited no abnormalities [Normal Jugular Venous A Waves Present] : normal jugular venous A waves present [Normal Jugular Venous V Waves Present] : normal jugular venous V waves present [Respiration, Rhythm And Depth] : normal respiratory rhythm and effort [Exaggerated Use Of Accessory Muscles For Inspiration] : no accessory muscle use [Auscultation Breath Sounds / Voice Sounds] : lungs were clear to auscultation bilaterally [Normal Rate] : normal [Rhythm Regular] : regular [Normal S1] : normal S1 [Normal S2] : normal S2 [II] : a grade 2 [I] : a grade 1 [No Pitting Edema] : no pitting edema present [Bowel Sounds] : normal bowel sounds [Abdomen Soft] : soft [Abdomen Tenderness] : non-tender [Abnormal Walk] : normal gait [Gait - Sufficient For Exercise Testing] : the gait was sufficient for exercise testing [Nail Clubbing] : no clubbing of the fingernails [Petechial Hemorrhages (___cm)] : no petechial hemorrhages [Cyanosis, Localized] : no localized cyanosis [Skin Color & Pigmentation] : normal skin color and pigmentation [] : no rash [No Skin Ulcers] : no skin ulcer [Oriented To Time, Place, And Person] : oriented to person, place, and time [Affect] : the affect was normal [No Anxiety] : not feeling anxious [Mood] : the mood was normal [FreeTextEntry1] : She was wearing a face mask during the examination.  [S3] : no S3 [Right Carotid Bruit] : no bruit heard over the right carotid [Left Carotid Bruit] : no bruit heard over the left carotid [Bruit] : no bruit heard

## 2021-10-04 NOTE — DISCUSSION/SUMMARY
[FreeTextEntry1] : IMPRESSION: Ms. Dill is a 76 year old woman with a history of hyperlipidemia, mitral regurgitation, hypothyroidism, SVT, and APCs who presents today for follow up of rhythm disorder and hyperlipidemia.\par \par PLAN:\par 1. She will schedule an echocardiogram at the time of her next visit to follow up her mitral regurgitation.\par 2. There was no ectopy on exam or her ECG, thus she will continue on Toprol XL 37.5mg daily.\par 3. She will continue on Lipitor 10 mg every other day along with diet modification. I will be checking a fasting lipid panel and CMP today.\par 4. She will follow up with me in 6 months or sooner should she experience any symptoms in the interim.\par 5. I will be checking COVID antibodies at her request.\par 6. She was given the flu shot today.\par 7. I spent approximately 30 minutes with the patient and her daughter during this encounter, including documentation.

## 2021-10-15 ENCOUNTER — APPOINTMENT (OUTPATIENT)
Dept: OBGYN | Facility: CLINIC | Age: 76
End: 2021-10-15
Payer: MEDICARE

## 2021-10-15 DIAGNOSIS — Z01.419 ENCOUNTER FOR GYNECOLOGICAL EXAMINATION (GENERAL) (ROUTINE) W/OUT ABNORMAL FINDINGS: ICD-10-CM

## 2021-10-15 PROCEDURE — 99387 INIT PM E/M NEW PAT 65+ YRS: CPT | Mod: 25

## 2021-10-15 PROCEDURE — G0101: CPT

## 2021-10-15 NOTE — DISCUSSION/SUMMARY
[FreeTextEntry1] : [] ASCCP pap guidelines discussed. through shared decision making decided no pap. unremarkable exam.\par [] mammo referral\par [] other HME by pcp\par RTC in 2yrs or sooner prn\par Jake COTE

## 2021-10-15 NOTE — HISTORY OF PRESENT ILLNESS
[FreeTextEntry1] : 77yo P5 here for annual gyn exam. she is accompanied by her daughter today who is also here for her annual check up. translation was done by her daughter.\par menopause was in her 50s, no vb since then.\par \par last gyn exam was 2.5yrs ago, denies hx of abnormal pap.\par mammo was about 2yrs ago, denies hx of abnormal mammo. \par colonoscopy was 3.5yrs ago, wnl. DEXA 2019. \par \par lives with daughter's family.\par

## 2021-10-21 ENCOUNTER — APPOINTMENT (OUTPATIENT)
Dept: INTERNAL MEDICINE | Facility: CLINIC | Age: 76
End: 2021-10-21

## 2022-01-28 ENCOUNTER — NON-APPOINTMENT (OUTPATIENT)
Age: 77
End: 2022-01-28

## 2022-01-31 ENCOUNTER — APPOINTMENT (OUTPATIENT)
Dept: ENDOCRINOLOGY | Facility: CLINIC | Age: 77
End: 2022-01-31
Payer: MEDICARE

## 2022-01-31 ENCOUNTER — NON-APPOINTMENT (OUTPATIENT)
Age: 77
End: 2022-01-31

## 2022-01-31 VITALS
SYSTOLIC BLOOD PRESSURE: 132 MMHG | HEIGHT: 59.65 IN | BODY MASS INDEX: 26.66 KG/M2 | TEMPERATURE: 96.8 F | WEIGHT: 135.79 LBS | HEART RATE: 83 BPM | DIASTOLIC BLOOD PRESSURE: 70 MMHG | OXYGEN SATURATION: 98 %

## 2022-01-31 DIAGNOSIS — M81.0 AGE-RELATED OSTEOPOROSIS W/OUT CURRENT PATHOLOGICAL FRACTURE: ICD-10-CM

## 2022-01-31 PROCEDURE — 99213 OFFICE O/P EST LOW 20 MIN: CPT | Mod: 25

## 2022-01-31 PROCEDURE — 36415 COLL VENOUS BLD VENIPUNCTURE: CPT

## 2022-02-01 DIAGNOSIS — E06.3 AUTOIMMUNE THYROIDITIS: ICD-10-CM

## 2022-02-03 PROBLEM — M81.0 OSTEOPOROSIS: Status: ACTIVE | Noted: 2018-12-11

## 2022-02-03 LAB
T4 FREE SERPL-MCNC: 1.6 NG/DL
THYROGLOB AB SERPL-ACNC: <20 IU/ML
THYROPEROXIDASE AB SERPL IA-ACNC: 53.2 IU/ML
TSH SERPL-ACNC: 1.7 UIU/ML

## 2022-02-03 NOTE — PHYSICAL EXAM
[Alert] : alert [Well Nourished] : well nourished [Healthy Appearance] : healthy appearance [No Acute Distress] : no acute distress [Well Developed] : well developed [Normal Voice/Communication] : normal voice communication [Normal Sclera/Conjunctiva] : normal sclera/conjunctiva [No Proptosis] : no proptosis [No Neck Mass] : no neck mass was observed [No LAD] : no lymphadenopathy [Supple] : the neck was supple [No Respiratory Distress] : no respiratory distress [Normal Rate] : heart rate was normal [Normal Affect] : the affect was normal [Normal Mood] : the mood was normal

## 2022-02-03 NOTE — HISTORY OF PRESENT ILLNESS
[FreeTextEntry1] : She is here with her daughter-in-law who provides translation.\par CC: Hypothyroidism\par This is a 76-year-old female with primary hypothyroidism, hyperlipidemia, osteoporosis, hyperlipidemia, SVTs, here for evaluation.\par She has had hypothyroidism since age 46.\par She is currently on levothyroxine 75 mcg 6 days/week.  She takes levothyroxine in the morning on empty stomach.\par She has been on Fosamax since 2016.  Last DEXA was in April 2019.\par

## 2022-02-03 NOTE — ASSESSMENT
[FreeTextEntry1] : This is a 76-year-old female with primary hypothyroidism, hyperlipidemia, osteoporosis, hyperlipidemia, SVTs, here for evaluation.\par She is currently on levothyroxine 75 mcg 6 days/week.  Check TFTs.\par She is clinically euthyroid.\par She has been on Fosamax since 2016.  Last DEXA was in April 2019.  Check DEXA.\par Will consider bisphosphonate holiday pending results.\par

## 2022-02-04 ENCOUNTER — NON-APPOINTMENT (OUTPATIENT)
Age: 77
End: 2022-02-04

## 2022-02-08 ENCOUNTER — APPOINTMENT (OUTPATIENT)
Dept: INTERNAL MEDICINE | Facility: CLINIC | Age: 77
End: 2022-02-08
Payer: MEDICARE

## 2022-02-08 VITALS
DIASTOLIC BLOOD PRESSURE: 81 MMHG | HEART RATE: 76 BPM | SYSTOLIC BLOOD PRESSURE: 127 MMHG | OXYGEN SATURATION: 98 % | TEMPERATURE: 97.3 F | RESPIRATION RATE: 12 BRPM | WEIGHT: 139 LBS | HEIGHT: 59.65 IN | BODY MASS INDEX: 27.29 KG/M2

## 2022-02-08 DIAGNOSIS — M25.512 PAIN IN LEFT SHOULDER: ICD-10-CM

## 2022-02-08 DIAGNOSIS — M65.30 TRIGGER FINGER, UNSPECIFIED FINGER: ICD-10-CM

## 2022-02-08 DIAGNOSIS — M54.32 SCIATICA, LEFT SIDE: ICD-10-CM

## 2022-02-08 PROCEDURE — 99213 OFFICE O/P EST LOW 20 MIN: CPT

## 2022-02-08 PROCEDURE — G0439: CPT

## 2022-02-08 PROCEDURE — 99397 PER PM REEVAL EST PAT 65+ YR: CPT

## 2022-02-09 NOTE — HEALTH RISK ASSESSMENT
[Never] : Never [No] : No [No falls in past year] : Patient reported no falls in the past year [0] : 2) Feeling down, depressed, or hopeless: Not at all (0) [With Family] : lives with family [] :  [# Of Children ___] : has [unfilled] children [Fully functional (bathing, dressing, toileting, transferring, walking, feeding)] : Fully functional (bathing, dressing, toileting, transferring, walking, feeding) [Fully functional (using the telephone, shopping, preparing meals, housekeeping, doing laundry, using] : Fully functional and needs no help or supervision to perform IADLs (using the telephone, shopping, preparing meals, housekeeping, doing laundry, using transportation, managing medications and managing finances) [MammogramDate] : Jan 2022 [PapSmearDate] : Oct 2021 [BoneDensityDate] : April 2019 [ColonoscopyDate] : Jan 20217

## 2022-02-09 NOTE — PHYSICAL EXAM
[No Acute Distress] : no acute distress [Well Nourished] : well nourished [Well Developed] : well developed [Well-Appearing] : well-appearing [Normal Sclera/Conjunctiva] : normal sclera/conjunctiva [PERRL] : pupils equal round and reactive to light [EOMI] : extraocular movements intact [Normal Outer Ear/Nose] : the outer ears and nose were normal in appearance [Normal Oropharynx] : the oropharynx was normal [No JVD] : no jugular venous distention [No Lymphadenopathy] : no lymphadenopathy [Supple] : supple [Thyroid Normal, No Nodules] : the thyroid was normal and there were no nodules present [No Respiratory Distress] : no respiratory distress  [No Accessory Muscle Use] : no accessory muscle use [Clear to Auscultation] : lungs were clear to auscultation bilaterally [Normal Rate] : normal rate  [Regular Rhythm] : with a regular rhythm [Normal S1, S2] : normal S1 and S2 [No Murmur] : no murmur heard [Pedal Pulses Present] : the pedal pulses are present [No Edema] : there was no peripheral edema [No Extremity Clubbing/Cyanosis] : no extremity clubbing/cyanosis [Soft] : abdomen soft [Non Tender] : non-tender [Non-distended] : non-distended [No Masses] : no abdominal mass palpated [No HSM] : no HSM [Normal Bowel Sounds] : normal bowel sounds [Normal Posterior Cervical Nodes] : no posterior cervical lymphadenopathy [Normal Anterior Cervical Nodes] : no anterior cervical lymphadenopathy [No CVA Tenderness] : no CVA  tenderness [No Spinal Tenderness] : no spinal tenderness [No Rash] : no rash [Coordination Grossly Intact] : coordination grossly intact [No Focal Deficits] : no focal deficits [Normal Gait] : normal gait [Normal Affect] : the affect was normal [Normal Insight/Judgement] : insight and judgment were intact [de-identified] : + pain in left shoulder in end ranges of abduction and external rotation, + left 3rd  trigger finger

## 2022-02-09 NOTE — HISTORY OF PRESENT ILLNESS
[de-identified] : \par Ms. AHMET JEROME is a 76 year old female , with hx of SVT, hypothyroid, hypercholesterolemia, osteoporosis, who is accompanied by her daughter, presents for annual physical\par She is going to PT for her low back/sciatica, is on gabapentin and as per daughter she is doing much better\par For the past 3 months when she tries to reach up or hold on with the left arm she gets a lot of pain in the left shoulder. It does not bother her at rest.\par Also her left 3rd middle finger "locks" for the past 3 months Has hx of trigger finger on the same finger and had injection in the past ( about 6 years ago)\par Denies SOB, chest pain, abdominal pain, N/V/D, leg swellingm HA, dizziness

## 2022-02-09 NOTE — ASSESSMENT
[FreeTextEntry1] : \par \par Physical\par She si UTD with her PAP, mammogram, colonoscopy\par referred for Bone Density scan\par \par left shoulder pain\par referred for PT\par \par left trigger finger\par referred to hand Ortho\par \par Hx of left sciatica\par cont PT-new referral given\par cont gabapentin\par \par  hx of SVT\par cont metorpolol 25mg 1.5 tabs daily\par follows with cardiology\par \par hypothyroid\par cont Synthroid 75mcg daily-renewed today\par we'll check TSH/T4 today\par \par Hypercholesterolemia\par cont Atorvastatin 10mg daily-renewed today\par discussed importance of following a low cholesterol/low fat diet\par we'll check Lipid panel and LFT's today\par \par osteoporosis\par cont Fosamax weekly, Vit D and Calcium\par \par blood work ordered\par \par follow up in one week for lab results\par

## 2022-02-10 LAB
BASOPHILS # BLD AUTO: 0.05 K/UL
BASOPHILS NFR BLD AUTO: 0.7 %
EOSINOPHIL # BLD AUTO: 0.13 K/UL
EOSINOPHIL NFR BLD AUTO: 1.7 %
ESTIMATED AVERAGE GLUCOSE: 111 MG/DL
HBA1C MFR BLD HPLC: 5.5 %
HCT VFR BLD CALC: 43.3 %
HGB BLD-MCNC: 13.2 G/DL
IMM GRANULOCYTES NFR BLD AUTO: 0.3 %
LYMPHOCYTES # BLD AUTO: 2.4 K/UL
LYMPHOCYTES NFR BLD AUTO: 31.4 %
MAN DIFF?: NORMAL
MCHC RBC-ENTMCNC: 29.1 PG
MCHC RBC-ENTMCNC: 30.5 GM/DL
MCV RBC AUTO: 95.4 FL
MONOCYTES # BLD AUTO: 0.66 K/UL
MONOCYTES NFR BLD AUTO: 8.6 %
NEUTROPHILS # BLD AUTO: 4.38 K/UL
NEUTROPHILS NFR BLD AUTO: 57.3 %
PLATELET # BLD AUTO: 253 K/UL
RBC # BLD: 4.54 M/UL
RBC # FLD: 12.6 %
WBC # FLD AUTO: 7.64 K/UL

## 2022-02-18 LAB
25(OH)D3 SERPL-MCNC: 68.7 NG/ML
ALBUMIN SERPL ELPH-MCNC: 4.2 G/DL
ALP BLD-CCNC: 73 U/L
ALT SERPL-CCNC: 10 U/L
ANION GAP SERPL CALC-SCNC: 17 MMOL/L
AST SERPL-CCNC: 23 U/L
BILIRUB SERPL-MCNC: 0.2 MG/DL
BUN SERPL-MCNC: 14 MG/DL
CALCIUM SERPL-MCNC: 9.1 MG/DL
CHLORIDE SERPL-SCNC: 101 MMOL/L
CO2 SERPL-SCNC: 21 MMOL/L
COVID-19 NUCLEOCAPSID  GAM ANTIBODY INTERPRETATION: POSITIVE
COVID-19 SPIKE DOMAIN ANTIBODY INTERPRETATION: POSITIVE
CREAT SERPL-MCNC: 0.94 MG/DL
GLUCOSE SERPL-MCNC: 68 MG/DL
POTASSIUM SERPL-SCNC: 3.6 MMOL/L
PROT SERPL-MCNC: 7.4 G/DL
SARS-COV-2 AB SERPL IA-ACNC: >250 U/ML
SARS-COV-2 AB SERPL QL IA: 1.64 INDEX
SODIUM SERPL-SCNC: 139 MMOL/L
TSH SERPL-ACNC: 1.48 UIU/ML
VIT B12 SERPL-MCNC: 612 PG/ML

## 2022-02-24 LAB
CHOLEST SERPL-MCNC: 181 MG/DL
HDLC SERPL-MCNC: 49 MG/DL
LDLC SERPL CALC-MCNC: 94 MG/DL
NONHDLC SERPL-MCNC: 132 MG/DL
TRIGL SERPL-MCNC: 191 MG/DL

## 2022-03-01 LAB
APPEARANCE: CLEAR
BILIRUBIN URINE: NEGATIVE
BLOOD URINE: NEGATIVE
COLOR: NORMAL
GLUCOSE QUALITATIVE U: NEGATIVE
KETONES URINE: NEGATIVE
LEUKOCYTE ESTERASE URINE: NEGATIVE
NITRITE URINE: NEGATIVE
PH URINE: 5
PROTEIN URINE: NEGATIVE
SPECIFIC GRAVITY URINE: 1.01
UROBILINOGEN URINE: NORMAL

## 2022-03-08 ENCOUNTER — APPOINTMENT (OUTPATIENT)
Dept: CARDIOLOGY | Facility: CLINIC | Age: 77
End: 2022-03-08
Payer: MEDICARE

## 2022-03-08 ENCOUNTER — NON-APPOINTMENT (OUTPATIENT)
Age: 77
End: 2022-03-08

## 2022-03-08 VITALS
SYSTOLIC BLOOD PRESSURE: 126 MMHG | BODY MASS INDEX: 26.5 KG/M2 | DIASTOLIC BLOOD PRESSURE: 87 MMHG | HEIGHT: 59.65 IN | OXYGEN SATURATION: 99 % | RESPIRATION RATE: 12 BRPM | TEMPERATURE: 97.1 F | HEART RATE: 91 BPM | WEIGHT: 135 LBS

## 2022-03-08 LAB
25(OH)D3 SERPL-MCNC: 49 NG/ML
ALBUMIN SERPL ELPH-MCNC: 4 G/DL
ALP BLD-CCNC: 60 U/L
ALT SERPL-CCNC: 13 U/L
ANION GAP SERPL CALC-SCNC: 14 MMOL/L
AST SERPL-CCNC: 21 U/L
BASOPHILS # BLD AUTO: 0.05 K/UL
BASOPHILS NFR BLD AUTO: 0.8 %
BILIRUB SERPL-MCNC: 0.2 MG/DL
BUN SERPL-MCNC: 13 MG/DL
CALCIUM SERPL-MCNC: 9.2 MG/DL
CHLORIDE SERPL-SCNC: 101 MMOL/L
CHOLEST SERPL-MCNC: 185 MG/DL
CO2 SERPL-SCNC: 24 MMOL/L
COVID-19 SPIKE DOMAIN ANTIBODY INTERPRETATION: POSITIVE
CREAT SERPL-MCNC: 0.94 MG/DL
EOSINOPHIL # BLD AUTO: 0.05 K/UL
EOSINOPHIL NFR BLD AUTO: 0.8 %
ESTIMATED AVERAGE GLUCOSE: 105 MG/DL
GLUCOSE SERPL-MCNC: 64 MG/DL
HBA1C MFR BLD HPLC: 5.3 %
HCT VFR BLD CALC: 40.1 %
HDLC SERPL-MCNC: 50 MG/DL
HGB BLD-MCNC: 12.3 G/DL
IMM GRANULOCYTES NFR BLD AUTO: 0.2 %
LDLC SERPL CALC-MCNC: 103 MG/DL
LYMPHOCYTES # BLD AUTO: 1.67 K/UL
LYMPHOCYTES NFR BLD AUTO: 25.7 %
MAN DIFF?: NORMAL
MCHC RBC-ENTMCNC: 30.3 PG
MCHC RBC-ENTMCNC: 30.7 GM/DL
MCV RBC AUTO: 98.8 FL
MONOCYTES # BLD AUTO: 0.58 K/UL
MONOCYTES NFR BLD AUTO: 8.9 %
NEUTROPHILS # BLD AUTO: 4.14 K/UL
NEUTROPHILS NFR BLD AUTO: 63.6 %
NONHDLC SERPL-MCNC: 135 MG/DL
PLATELET # BLD AUTO: 231 K/UL
POTASSIUM SERPL-SCNC: 4.1 MMOL/L
PROT SERPL-MCNC: 7 G/DL
RBC # BLD: 4.06 M/UL
RBC # FLD: 13.4 %
SARS-COV-2 AB SERPL IA-ACNC: 186 U/ML
SODIUM SERPL-SCNC: 139 MMOL/L
TRIGL SERPL-MCNC: 159 MG/DL
TSH SERPL-ACNC: 0.87 UIU/ML
WBC # FLD AUTO: 6.5 K/UL

## 2022-03-08 PROCEDURE — 99213 OFFICE O/P EST LOW 20 MIN: CPT | Mod: 25

## 2022-03-08 PROCEDURE — 93306 TTE W/DOPPLER COMPLETE: CPT

## 2022-03-08 PROCEDURE — 93000 ELECTROCARDIOGRAM COMPLETE: CPT

## 2022-03-13 NOTE — PHYSICAL EXAM
[General Appearance - Well Developed] : well developed [Normal Appearance] : normal appearance [Well Groomed] : well groomed [General Appearance - Well Nourished] : well nourished [No Deformities] : no deformities [General Appearance - In No Acute Distress] : no acute distress [Normal Conjunctiva] : the conjunctiva exhibited no abnormalities [Normal Jugular Venous A Waves Present] : normal jugular venous A waves present [Normal Jugular Venous V Waves Present] : normal jugular venous V waves present [Respiration, Rhythm And Depth] : normal respiratory rhythm and effort [Exaggerated Use Of Accessory Muscles For Inspiration] : no accessory muscle use [Auscultation Breath Sounds / Voice Sounds] : lungs were clear to auscultation bilaterally [Normal Rate] : normal [Rhythm Regular] : regular [Normal S1] : normal S1 [Normal S2] : normal S2 [II] : a grade 2 [I] : a grade 1 [No Pitting Edema] : no pitting edema present [Bowel Sounds] : normal bowel sounds [Abdomen Soft] : soft [Abdomen Tenderness] : non-tender [Abnormal Walk] : normal gait [Gait - Sufficient For Exercise Testing] : the gait was sufficient for exercise testing [Nail Clubbing] : no clubbing of the fingernails [Cyanosis, Localized] : no localized cyanosis [Petechial Hemorrhages (___cm)] : no petechial hemorrhages [Skin Color & Pigmentation] : normal skin color and pigmentation [] : no rash [No Skin Ulcers] : no skin ulcer [Oriented To Time, Place, And Person] : oriented to person, place, and time [Affect] : the affect was normal [No Anxiety] : not feeling anxious [Mood] : the mood was normal [Right Carotid Bruit] : no bruit heard over the right carotid [S3] : no S3 [Left Carotid Bruit] : no bruit heard over the left carotid [Bruit] : no bruit heard [FreeTextEntry1] : She has reproducible left-sided scapular pain.

## 2022-03-13 NOTE — HISTORY OF PRESENT ILLNESS
[FreeTextEntry1] : Patient is a 76 year old woman with a history of hyperlipidemia, hypothyroidism, mitral regurgitation, SVT, and APCs who presents today for follow up of her rhythm disorder and hyperlipidemia. She mentions experiencing left scapular pain for the past 2 months. She otherwise denies any palpitations, headaches, dizziness, exertional chest pain, or dyspnea.

## 2022-03-13 NOTE — DISCUSSION/SUMMARY
[FreeTextEntry1] : IMPRESSION: Ms. Dill is a 76 year old woman with a history of hyperlipidemia, mitral regurgitation, hypothyroidism, SVT, and APCs who presents today for follow up of rhythm disorder and hyperlipidemia.\par \par PLAN:\par 1. She had an echocardiogram performed today that revealed preserved left ventricular ejection fraction.  There was no ectopy on exam or her ECG, thus she will continue on Toprol XL 37.5mg daily.\par 3. She will continue on Lipitor 10 mg every other day along with diet modification. Her most recent LDL was good with mildly elevated triglycerides. \par 3. She will follow up with me in 6 months or sooner should she experience any symptoms in the interim.\par 4. I explained to her daughter over the phone that she has reproducible scapular pain and that this is likely radiating to her anterior chest on occasion. She will notify me should there be any change in her symptoms.

## 2022-06-22 ENCOUNTER — APPOINTMENT (OUTPATIENT)
Dept: INTERNAL MEDICINE | Facility: CLINIC | Age: 77
End: 2022-06-22
Payer: MEDICARE

## 2022-06-22 VITALS
TEMPERATURE: 97.1 F | WEIGHT: 138 LBS | HEART RATE: 88 BPM | OXYGEN SATURATION: 100 % | DIASTOLIC BLOOD PRESSURE: 62 MMHG | BODY MASS INDEX: 27.27 KG/M2 | RESPIRATION RATE: 12 BRPM | SYSTOLIC BLOOD PRESSURE: 102 MMHG

## 2022-06-22 PROCEDURE — 99214 OFFICE O/P EST MOD 30 MIN: CPT

## 2022-06-22 NOTE — ASSESSMENT
[FreeTextEntry1] : \par \par \par Hypercholesterolemia\par cont Atorvastatin 10mg daily-renewed today\par discussed importance of following a low cholesterol/low fat diet\par we'll check Lipid panel and LFT's today\par \par hypothyroid\par cont Synthroid 75mcg daily-renewed today\par we'll check TSH/T4 today\par \par Hx of left sciatica\par cont PT-new referral given\par cont gabapentin\par \par  hx of SVT\par cont metorpolol 25mg 1.5 tabs daily\par \par osteoporosis\par cont Fosamax weekly, Vit D and Calcium\par \par all meds renewed\par \par blood work ordered\par \par follow up in one week for lab results\par

## 2022-06-22 NOTE — PHYSICAL EXAM
[No Acute Distress] : no acute distress [Well Nourished] : well nourished [Well Developed] : well developed [Well-Appearing] : well-appearing [Normal Sclera/Conjunctiva] : normal sclera/conjunctiva [PERRL] : pupils equal round and reactive to light [EOMI] : extraocular movements intact [Normal Outer Ear/Nose] : the outer ears and nose were normal in appearance [Normal Oropharynx] : the oropharynx was normal [No JVD] : no jugular venous distention [No Lymphadenopathy] : no lymphadenopathy [Supple] : supple [Thyroid Normal, No Nodules] : the thyroid was normal and there were no nodules present [No Respiratory Distress] : no respiratory distress  [No Accessory Muscle Use] : no accessory muscle use [Clear to Auscultation] : lungs were clear to auscultation bilaterally [Normal Rate] : normal rate  [Regular Rhythm] : with a regular rhythm [Normal S1, S2] : normal S1 and S2 [No Murmur] : no murmur heard [Pedal Pulses Present] : the pedal pulses are present [No Edema] : there was no peripheral edema [No Extremity Clubbing/Cyanosis] : no extremity clubbing/cyanosis [Soft] : abdomen soft [Non Tender] : non-tender [Non-distended] : non-distended [Normal Bowel Sounds] : normal bowel sounds [Normal Posterior Cervical Nodes] : no posterior cervical lymphadenopathy [Normal Anterior Cervical Nodes] : no anterior cervical lymphadenopathy [No CVA Tenderness] : no CVA  tenderness [No Spinal Tenderness] : no spinal tenderness [No Rash] : no rash [Coordination Grossly Intact] : coordination grossly intact [No Focal Deficits] : no focal deficits [Normal Gait] : normal gait [Speech Grossly Normal] : speech grossly normal [Normal Affect] : the affect was normal [Alert and Oriented x3] : oriented to person, place, and time [Normal Insight/Judgement] : insight and judgment were intact

## 2022-06-22 NOTE — HISTORY OF PRESENT ILLNESS
[de-identified] : \par Ms. AHMET JEROME is a 76 year old female , with hx of SVT, hypothyroid, hypercholesterolemia, osteoporosis, who is accompanied by her daughter, presents for follow up visit\par She is doing well\par Still going to PT once a week, for her low back/sciatica, on gabapentin daily and says back is doing much better\par Denies SOB, chest pain, abdominal pain, N/V/D, leg swellingm HA, dizziness

## 2022-07-19 LAB
ALBUMIN SERPL ELPH-MCNC: 4.4 G/DL
ALP BLD-CCNC: 74 U/L
ALT SERPL-CCNC: 14 U/L
ANION GAP SERPL CALC-SCNC: 11 MMOL/L
AST SERPL-CCNC: 26 U/L
BILIRUB SERPL-MCNC: 0.3 MG/DL
BUN SERPL-MCNC: 14 MG/DL
CALCIUM SERPL-MCNC: 9.5 MG/DL
CHLORIDE SERPL-SCNC: 99 MMOL/L
CHOLEST SERPL-MCNC: 179 MG/DL
CO2 SERPL-SCNC: 27 MMOL/L
CREAT SERPL-MCNC: 0.95 MG/DL
EGFR: 62 ML/MIN/1.73M2
ESTIMATED AVERAGE GLUCOSE: 108 MG/DL
GLUCOSE SERPL-MCNC: 90 MG/DL
HBA1C MFR BLD HPLC: 5.4 %
HDLC SERPL-MCNC: 57 MG/DL
LDLC SERPL CALC-MCNC: 90 MG/DL
NONHDLC SERPL-MCNC: 122 MG/DL
POTASSIUM SERPL-SCNC: 3.7 MMOL/L
PROT SERPL-MCNC: 7.5 G/DL
SODIUM SERPL-SCNC: 137 MMOL/L
TRIGL SERPL-MCNC: 161 MG/DL
TSH SERPL-ACNC: 1.46 UIU/ML

## 2022-07-25 ENCOUNTER — APPOINTMENT (OUTPATIENT)
Dept: ENDOCRINOLOGY | Facility: CLINIC | Age: 77
End: 2022-07-25

## 2022-09-21 ENCOUNTER — APPOINTMENT (OUTPATIENT)
Dept: CARDIOLOGY | Facility: CLINIC | Age: 77
End: 2022-09-21

## 2023-03-21 ENCOUNTER — OFFICE (OUTPATIENT)
Dept: URBAN - METROPOLITAN AREA CLINIC 90 | Facility: CLINIC | Age: 78
Setting detail: OPHTHALMOLOGY
End: 2023-03-21
Payer: MEDICARE

## 2023-03-21 DIAGNOSIS — H35.54: ICD-10-CM

## 2023-03-21 DIAGNOSIS — Z96.1: ICD-10-CM

## 2023-03-21 DIAGNOSIS — H40.052: ICD-10-CM

## 2023-03-21 DIAGNOSIS — H17.9: ICD-10-CM

## 2023-03-21 DIAGNOSIS — H35.3131: ICD-10-CM

## 2023-03-21 DIAGNOSIS — H04.123: ICD-10-CM

## 2023-03-21 PROCEDURE — 92134 CPTRZ OPH DX IMG PST SGM RTA: CPT | Performed by: OPHTHALMOLOGY

## 2023-03-21 PROCEDURE — 92014 COMPRE OPH EXAM EST PT 1/>: CPT | Performed by: OPHTHALMOLOGY

## 2023-03-21 PROCEDURE — 76514 ECHO EXAM OF EYE THICKNESS: CPT | Performed by: OPHTHALMOLOGY

## 2023-03-21 ASSESSMENT — REFRACTION_MANIFEST
OS_ADD: +3.00
OD_VA1: 20/NI
OD_SPHERE: PLANO
OS_AXIS: 160
OS_CYLINDER: +0.75
OS_VA1: 20/30-2
OS_VA2: 20/30+2
OD_AXIS: 025
OD_CYLINDER: +3.25
OD_SPHERE: -3.00
OS_SPHERE: +0.50
OD_ADD: +3.25
OD_VA1: 20/40
OD_VA2: 20/30+2
OS_AXIS: 155
OS_ADD: +3.25
OS_SPHERE: +0.25
OS_CYLINDER: +0.75
OS_CYLINDER: +0.75
OS_SPHERE: +0.75
OD_SPHERE: UNABLE
OS_AXIS: 155
OS_VA1: 20/25
OD_ADD: +3.00
OS_VA1: 20/30

## 2023-03-21 ASSESSMENT — REFRACTION_CURRENTRX
OD_CYLINDER: -3.00
OD_SPHERE: +3.25
OD_OVR_VA: 20/
OS_SPHERE: PLANO
OS_VPRISM_DIRECTION: SV
OD_SPHERE: +0.25
OS_SPHERE: +4.00
OD_VPRISM_DIRECTION: SV
OD_CYLINDER: -4.00
OS_OVR_VA: 20/
OS_AXIS: 146
OS_SPHERE: +3.25
OS_OVR_VA: 20/
OD_SPHERE: PLANO
OD_CYLINDER: +3.25
OS_OVR_VA: 20/
OD_SPHERE: +3.00
OD_AXIS: 114
OD_CYLINDER: -3.25
OS_SPHERE: +2.75
OD_VPRISM_DIRECTION: SV
OS_CYLINDER: SPH
OS_VPRISM_DIRECTION: SV
OS_VPRISM_DIRECTION: SV
OD_VPRISM_DIRECTION: SV
OD_SPHERE: PLANO
OS_AXIS: 059
OS_CYLINDER: +0.75
OD_VPRISM_DIRECTION: SV
OD_OVR_VA: 20/
OD_CYLINDER: -3.25
OS_SPHERE: +1.00
OS_VPRISM_DIRECTION: SV
OD_AXIS: 115
OS_CYLINDER: -0.75
OS_CYLINDER: -0.75
OD_VPRISM_DIRECTION: SV
OD_AXIS: 114
OS_VPRISM_DIRECTION: SV
OS_AXIS: 061
OD_AXIS: 026
OD_AXIS: 112
OD_OVR_VA: 20/

## 2023-03-21 ASSESSMENT — KERATOMETRY
OD_K2POWER_DIOPTERS: 463.75
OD_K1POWER_DIOPTERS: 45.00
OS_K2POWER_DIOPTERS: 46.25
METHOD_AUTO_MANUAL: AUTO
OS_K1POWER_DIOPTERS: 46.25
OS_AXISANGLE_DEGREES: 090
OD_AXISANGLE_DEGREES: 040

## 2023-03-21 ASSESSMENT — AXIALLENGTH_DERIVED
OS_AL: 22.3577
OS_AL: 22.3141
OD_AL: 5.479
OS_AL: 22.2273
OD_AL: 5.54
OS_AL: 22.4016

## 2023-03-21 ASSESSMENT — SPHEQUIV_DERIVED
OD_SPHEQUIV: -1.375
OS_SPHEQUIV: 0.75
OS_SPHEQUIV: 0.625
OS_SPHEQUIV: 0.875
OS_SPHEQUIV: 1.125
OD_SPHEQUIV: 1.375

## 2023-03-21 ASSESSMENT — REFRACTION_AUTOREFRACTION
OS_SPHERE: +1.00
OD_AXIS: 071
OD_SPHERE: +1.50
OS_AXIS: 077
OS_CYLINDER: -0.50
OD_CYLINDER: -0.25

## 2023-03-21 ASSESSMENT — VISUAL ACUITY
OD_BCVA: 20/30-2
OS_BCVA: 20/70

## 2023-03-21 ASSESSMENT — PACHYMETRY
OD_CT_UM: 544
OD_CT_CORRECTION: 0
OS_CT_CORRECTION: 4
OS_CT_UM: 484

## 2023-03-21 ASSESSMENT — TEAR BREAK UP TIME (TBUT)
OD_TBUT: T 1+
OS_TBUT: T 1+

## 2023-03-21 ASSESSMENT — SUPERFICIAL PUNCTATE KERATITIS (SPK)
OD_SPK: T
OS_SPK: T

## 2023-03-21 ASSESSMENT — CORNEAL EDEMA - FOLDS/STRIAE: OD_FOLDSSTRIAE: T 1+

## 2023-03-21 ASSESSMENT — CONFRONTATIONAL VISUAL FIELD TEST (CVF)
OD_FINDINGS: FULL
OS_FINDINGS: FULL

## 2023-03-21 ASSESSMENT — CORNEAL EDEMA CLINICAL DESCRIPTION: OD_CORNEALEDEMA: DEEP FOLDS

## 2023-03-29 ENCOUNTER — APPOINTMENT (OUTPATIENT)
Dept: INTERNAL MEDICINE | Facility: CLINIC | Age: 78
End: 2023-03-29
Payer: MEDICARE

## 2023-03-29 VITALS
BODY MASS INDEX: 28.63 KG/M2 | RESPIRATION RATE: 12 BRPM | TEMPERATURE: 97.3 F | HEIGHT: 59 IN | WEIGHT: 142 LBS | OXYGEN SATURATION: 100 % | DIASTOLIC BLOOD PRESSURE: 79 MMHG | HEART RATE: 88 BPM | SYSTOLIC BLOOD PRESSURE: 121 MMHG

## 2023-03-29 DIAGNOSIS — E03.9 HYPOTHYROIDISM, UNSPECIFIED: ICD-10-CM

## 2023-03-29 DIAGNOSIS — Z00.00 ENCOUNTER FOR GENERAL ADULT MEDICAL EXAMINATION W/OUT ABNORMAL FINDINGS: ICD-10-CM

## 2023-03-29 PROCEDURE — G0439: CPT

## 2023-03-29 RX ORDER — MELOXICAM 7.5 MG/1
7.5 TABLET ORAL TWICE DAILY
Qty: 20 | Refills: 0 | Status: DISCONTINUED | COMMUNITY
Start: 2021-07-29 | End: 2023-03-29

## 2023-03-29 RX ORDER — METHYLPREDNISOLONE 4 MG/1
4 TABLET ORAL
Qty: 21 | Refills: 0 | Status: DISCONTINUED | COMMUNITY
Start: 2021-06-21 | End: 2023-03-29

## 2023-03-29 RX ORDER — OXYCODONE AND ACETAMINOPHEN 5; 325 MG/1; MG/1
5-325 TABLET ORAL
Qty: 20 | Refills: 0 | Status: DISCONTINUED | COMMUNITY
Start: 2021-07-29 | End: 2023-03-29

## 2023-03-29 RX ORDER — ALBUTEROL SULFATE 90 UG/1
108 (90 BASE) AEROSOL, METERED RESPIRATORY (INHALATION) TWICE DAILY
Qty: 1 | Refills: 1 | Status: DISCONTINUED | COMMUNITY
Start: 2018-06-22 | End: 2023-03-29

## 2023-03-29 RX ORDER — ALENDRONATE SODIUM 70 MG/1
70 TABLET ORAL
Qty: 12 | Refills: 1 | Status: DISCONTINUED | COMMUNITY
Start: 2019-12-03 | End: 2023-03-29

## 2023-03-29 NOTE — HISTORY OF PRESENT ILLNESS
[de-identified] : Ms. AHMET JEROME is a 78 year old female , with history of SVT, hypothyroid, hypercholesterolemia, osteoporosis, who is accompanied by her daughter, presents for annual physical\par \par \par She feels well \par She maintains a balanced diet and tries to remain active. Goes to gym 2-3 times/week \par Denies any CP, SOB, HA, Dizziness, N/V or abdominal pain \par \par She continues to suffer with chronic lower back pain/Sciatica, currently takes gabapentin once daily and if pain is really bad up to 3 times/daily. Medications help control pain

## 2023-03-29 NOTE — HEALTH RISK ASSESSMENT
[Good] : ~his/her~  mood as  good [No] : No [No falls in past year] : Patient reported no falls in the past year [0] : 2) Feeling down, depressed, or hopeless: Not at all (0) [PHQ-2 Negative - No further assessment needed] : PHQ-2 Negative - No further assessment needed [Patient reported mammogram was normal] : Patient reported mammogram was normal [Patient reported PAP Smear was normal] : Patient reported PAP Smear was normal [With Family] : lives with family [Feels Safe at Home] : Feels safe at home [Fully functional (bathing, dressing, toileting, transferring, walking, feeding)] : Fully functional (bathing, dressing, toileting, transferring, walking, feeding) [Reports normal functional visual acuity (ie: able to read med bottle)] : Reports normal functional visual acuity [Smoke Detector] : smoke detector [Carbon Monoxide Detector] : carbon monoxide detector [Safety elements used in home] : safety elements used in home [Seat Belt] :  uses seat belt [Sunscreen] : uses sunscreen [] :  [# Of Children ___] : has [unfilled] children [Fully functional (using the telephone, shopping, preparing meals, housekeeping, doing laundry, using] : Fully functional and needs no help or supervision to perform IADLs (using the telephone, shopping, preparing meals, housekeeping, doing laundry, using transportation, managing medications and managing finances) [Never] : Never [[id number]] : 0 [Reports changes in hearing] : Reports no changes in hearing [Reports changes in vision] : Reports no changes in vision [Reports changes in dental health] : Reports no changes in dental health [Guns at Home] : no guns at home [Travel to Developing Areas] : does not  travel to developing areas [TB Exposure] : is not being exposed to tuberculosis [Caregiver Concerns] : does not have caregiver concerns [MammogramDate] : 1/2022 [PapSmearDate] : few years ago  [PapSmearComments] : No longer warranted  [BoneDensityDate] : 6/2022 [BoneDensityComments] : Osteoporosis  [ColonoscopyDate] : 2017 [de-identified] : Daughter

## 2023-03-29 NOTE — ASSESSMENT
[FreeTextEntry1] : Physical \par \par Referral for Mammo, UTD with other screenings \par \par HTN/HLD\par Low sodium, low cholesterol diet/ increased physical activity \par cont Metoprolol 25 mg 1 1/2 tabs daily \par cont Atorvastatin 10 mg daily \par \par Hypothyroidism \par cont Levothyroxine 75 mcg daily \par \par Chronic back pain/Sciatica \par cont Gabapentin 100 mg TID daily PRN \par \par Labs ordered pt to follow-up in 1 week for results

## 2023-04-03 LAB
25(OH)D3 SERPL-MCNC: 67.7 NG/ML
APPEARANCE: CLEAR
BACTERIA: NEGATIVE
BILIRUBIN URINE: NEGATIVE
BLOOD URINE: NEGATIVE
COLOR: COLORLESS
ESTIMATED AVERAGE GLUCOSE: 103 MG/DL
GLUCOSE QUALITATIVE U: NEGATIVE
HBA1C MFR BLD HPLC: 5.2 %
HYALINE CASTS: 0 /LPF
KETONES URINE: NEGATIVE
LEUKOCYTE ESTERASE URINE: NEGATIVE
MICROSCOPIC-UA: NORMAL
NITRITE URINE: NEGATIVE
PH URINE: 6
PROTEIN URINE: NEGATIVE
RED BLOOD CELLS URINE: 1 /HPF
SPECIFIC GRAVITY URINE: 1
SQUAMOUS EPITHELIAL CELLS: 0 /HPF
TSH SERPL-ACNC: 3.21 UIU/ML
UROBILINOGEN URINE: NORMAL
VIT B12 SERPL-MCNC: 474 PG/ML
WHITE BLOOD CELLS URINE: 3 /HPF

## 2023-04-14 LAB
ALBUMIN SERPL ELPH-MCNC: 4.4 G/DL
ALP BLD-CCNC: 92 U/L
ALT SERPL-CCNC: 31 U/L
ANION GAP SERPL CALC-SCNC: 17 MMOL/L
AST SERPL-CCNC: 44 U/L
BILIRUB SERPL-MCNC: 0.2 MG/DL
BUN SERPL-MCNC: 16 MG/DL
CALCIUM SERPL-MCNC: 8.9 MG/DL
CHLORIDE SERPL-SCNC: 98 MMOL/L
CHOLEST SERPL-MCNC: 198 MG/DL
CO2 SERPL-SCNC: 24 MMOL/L
CREAT SERPL-MCNC: 0.95 MG/DL
EGFR: 62 ML/MIN/1.73M2
GLUCOSE SERPL-MCNC: 79 MG/DL
HDLC SERPL-MCNC: 57 MG/DL
LDLC SERPL CALC-MCNC: 79 MG/DL
NONHDLC SERPL-MCNC: 141 MG/DL
POTASSIUM SERPL-SCNC: 3.8 MMOL/L
PROT SERPL-MCNC: 7.1 G/DL
SODIUM SERPL-SCNC: 139 MMOL/L
TRIGL SERPL-MCNC: 309 MG/DL

## 2023-04-17 LAB
BASOPHILS # BLD AUTO: 0.09 K/UL
BASOPHILS NFR BLD AUTO: 1.1 %
EOSINOPHIL # BLD AUTO: 0.12 K/UL
EOSINOPHIL NFR BLD AUTO: 1.4 %
HCT VFR BLD CALC: 40.7 %
HGB BLD-MCNC: 13.2 G/DL
IMM GRANULOCYTES NFR BLD AUTO: 0.2 %
LYMPHOCYTES # BLD AUTO: 2.27 K/UL
LYMPHOCYTES NFR BLD AUTO: 27 %
MAN DIFF?: NORMAL
MCHC RBC-ENTMCNC: 30.3 PG
MCHC RBC-ENTMCNC: 32.4 GM/DL
MCV RBC AUTO: 93.6 FL
MONOCYTES # BLD AUTO: 0.7 K/UL
MONOCYTES NFR BLD AUTO: 8.3 %
NEUTROPHILS # BLD AUTO: 5.21 K/UL
NEUTROPHILS NFR BLD AUTO: 62 %
PLATELET # BLD AUTO: 252 K/UL
RBC # BLD: 4.35 M/UL
RBC # FLD: 12.5 %
WBC # FLD AUTO: 8.41 K/UL

## 2023-06-02 LAB
CHOLEST SERPL-MCNC: 173 MG/DL
HDLC SERPL-MCNC: 54 MG/DL
LDLC SERPL CALC-MCNC: 92 MG/DL
NONHDLC SERPL-MCNC: 119 MG/DL
TRIGL SERPL-MCNC: 136 MG/DL

## 2023-06-22 ENCOUNTER — NON-APPOINTMENT (OUTPATIENT)
Age: 78
End: 2023-06-22

## 2023-06-22 ENCOUNTER — APPOINTMENT (OUTPATIENT)
Dept: CARDIOLOGY | Facility: CLINIC | Age: 78
End: 2023-06-22
Payer: MEDICARE

## 2023-06-22 VITALS
WEIGHT: 146 LBS | SYSTOLIC BLOOD PRESSURE: 106 MMHG | BODY MASS INDEX: 29.43 KG/M2 | HEIGHT: 59 IN | DIASTOLIC BLOOD PRESSURE: 79 MMHG | RESPIRATION RATE: 12 BRPM | HEART RATE: 78 BPM | OXYGEN SATURATION: 98 %

## 2023-06-22 DIAGNOSIS — I49.9 CARDIAC ARRHYTHMIA, UNSPECIFIED: ICD-10-CM

## 2023-06-22 PROCEDURE — 99214 OFFICE O/P EST MOD 30 MIN: CPT | Mod: 25

## 2023-06-22 PROCEDURE — 93000 ELECTROCARDIOGRAM COMPLETE: CPT

## 2023-06-25 NOTE — DISCUSSION/SUMMARY
[FreeTextEntry1] : IMPRESSION: Ms. Dill is a 78 year old woman with a history of hyperlipidemia, mitral regurgitation, hypothyroidism, SVT, and APCs who presents today for follow up of rhythm disorder and hyperlipidemia.\par \par PLAN:\par 1. There was no ectopy on exam or on her ECG that was performed today, thus she will continue on Toprol XL 37.5mg daily.\par 3. She will continue on Lipitor 10 mg daily along with diet modification. Her most recent LDL and triglycerides were good. \par 3. She will follow up with me in 4 months or sooner should she experience any symptoms in the interim.\par 4. I have asked her to schedule an echocardiogram at the time of her next visit to follow up her mitral regurgitation.  [EKG obtained to assist in diagnosis and management of assessed problem(s)] : EKG obtained to assist in diagnosis and management of assessed problem(s)

## 2023-06-25 NOTE — PHYSICAL EXAM
[General Appearance - Well Developed] : well developed [Normal Appearance] : normal appearance [Well Groomed] : well groomed [General Appearance - Well Nourished] : well nourished [No Deformities] : no deformities [General Appearance - In No Acute Distress] : no acute distress [Normal Conjunctiva] : the conjunctiva exhibited no abnormalities [Normal Jugular Venous A Waves Present] : normal jugular venous A waves present [Normal Jugular Venous V Waves Present] : normal jugular venous V waves present [Respiration, Rhythm And Depth] : normal respiratory rhythm and effort [Exaggerated Use Of Accessory Muscles For Inspiration] : no accessory muscle use [Auscultation Breath Sounds / Voice Sounds] : lungs were clear to auscultation bilaterally [Normal Rate] : normal [Rhythm Regular] : regular [Normal S1] : normal S1 [Normal S2] : normal S2 [II] : a grade 2 [I] : a grade 1 [No Pitting Edema] : no pitting edema present [Bowel Sounds] : normal bowel sounds [Abdomen Soft] : soft [Abdomen Tenderness] : non-tender [Abnormal Walk] : normal gait [Gait - Sufficient For Exercise Testing] : the gait was sufficient for exercise testing [Nail Clubbing] : no clubbing of the fingernails [Cyanosis, Localized] : no localized cyanosis [Petechial Hemorrhages (___cm)] : no petechial hemorrhages [Skin Color & Pigmentation] : normal skin color and pigmentation [] : no rash [No Skin Ulcers] : no skin ulcer [Oriented To Time, Place, And Person] : oriented to person, place, and time [Affect] : the affect was normal [Mood] : the mood was normal [No Anxiety] : not feeling anxious [FreeTextEntry1] : Extraocular muscles intact. Anicteric sclerae.  [Normal Oral Mucosa] : normal oral mucosa [No Oral Pallor] : no oral pallor [No Oral Cyanosis] : no oral cyanosis [S3] : no S3 [Right Carotid Bruit] : no bruit heard over the right carotid [Left Carotid Bruit] : no bruit heard over the left carotid [Bruit] : no bruit heard

## 2023-06-25 NOTE — CARDIOLOGY SUMMARY
[___] : [unfilled] [LVEF ___%] : LVEF [unfilled]% [None] : no pulmonary hypertension [de-identified] : 3/8/2022: Mild mitral regurgitation. Mild mitral stenosis. Mild diastolic dysfunction. Mild left ventricular enlargement. Normal left ventricular systolic function with an EF of approximately 70%. Small pericardial effusion. Mild tricuspid regurgitation. No pulmonary HTN. PASP= 30 mmHg.

## 2023-06-25 NOTE — HISTORY OF PRESENT ILLNESS
[FreeTextEntry1] : Patient is a 78 year old woman with a history of hyperlipidemia, hypothyroidism, mitral regurgitation, SVT, and APCs who presents today for follow up of her rhythm disorder and hyperlipidemia. She has been taking Atorvastatin 10 mg daily since her triglycerides were found to be very high.  She states that she gets palpitations if she does not take her Metoprolol on a regular basis.

## 2023-07-27 ENCOUNTER — APPOINTMENT (OUTPATIENT)
Dept: CARDIOLOGY | Facility: CLINIC | Age: 78
End: 2023-07-27

## 2023-10-29 ENCOUNTER — OFFICE (OUTPATIENT)
Dept: URBAN - METROPOLITAN AREA CLINIC 90 | Facility: CLINIC | Age: 78
Setting detail: OPHTHALMOLOGY
End: 2023-10-29
Payer: MEDICARE

## 2023-10-29 DIAGNOSIS — H16.223: ICD-10-CM

## 2023-10-29 DIAGNOSIS — H35.3131: ICD-10-CM

## 2023-10-29 DIAGNOSIS — H04.123: ICD-10-CM

## 2023-10-29 DIAGNOSIS — H17.9: ICD-10-CM

## 2023-10-29 DIAGNOSIS — H40.052: ICD-10-CM

## 2023-10-29 DIAGNOSIS — H35.54: ICD-10-CM

## 2023-10-29 DIAGNOSIS — Z96.1: ICD-10-CM

## 2023-10-29 PROCEDURE — 92083 EXTENDED VISUAL FIELD XM: CPT | Performed by: OPHTHALMOLOGY

## 2023-10-29 PROCEDURE — 92133 CPTRZD OPH DX IMG PST SGM ON: CPT | Performed by: OPHTHALMOLOGY

## 2023-10-29 PROCEDURE — 92014 COMPRE OPH EXAM EST PT 1/>: CPT | Performed by: OPHTHALMOLOGY

## 2023-10-29 ASSESSMENT — TONOMETRY
OD_IOP_MMHG: 14
OS_IOP_MMHG: 14

## 2023-10-29 ASSESSMENT — CORNEAL EDEMA CLINICAL DESCRIPTION: OD_CORNEALEDEMA: DEEP FOLDS

## 2023-10-29 ASSESSMENT — PACHYMETRY
OS_CT_CORRECTION: 4
OS_CT_UM: 484
OD_CT_CORRECTION: 0
OD_CT_UM: 544

## 2023-10-29 ASSESSMENT — SUPERFICIAL PUNCTATE KERATITIS (SPK)
OS_SPK: T
OD_SPK: T

## 2023-10-29 ASSESSMENT — CONFRONTATIONAL VISUAL FIELD TEST (CVF)
OS_FINDINGS: FULL
OD_FINDINGS: FULL

## 2023-10-29 ASSESSMENT — TEAR BREAK UP TIME (TBUT)
OS_TBUT: T 1+
OD_TBUT: T 1+

## 2023-10-29 ASSESSMENT — CORNEAL EDEMA - FOLDS/STRIAE: OD_FOLDSSTRIAE: T 1+

## 2023-10-30 ENCOUNTER — RX ONLY (RX ONLY)
Age: 78
End: 2023-10-30

## 2023-10-30 ASSESSMENT — REFRACTION_CURRENTRX
OD_CYLINDER: -3.25
OD_AXIS: 112
OD_SPHERE: PLANO
OD_CYLINDER: -3.00
OD_AXIS: 114
OS_AXIS: 059
OS_SPHERE: +1.00
OD_VPRISM_DIRECTION: SV
OS_SPHERE: PLANO
OD_SPHERE: +0.25
OS_SPHERE: +3.25
OS_VPRISM_DIRECTION: SV
OD_CYLINDER: -4.00
OS_OVR_VA: 20/
OS_CYLINDER: SPH
OS_OVR_VA: 20/
OS_AXIS: 146
OS_VPRISM_DIRECTION: SV
OS_VPRISM_DIRECTION: SV
OD_SPHERE: +3.25
OS_VPRISM_DIRECTION: SV
OS_CYLINDER: -0.75
OS_OVR_VA: 20/
OS_AXIS: 061
OS_CYLINDER: -0.75
OS_VPRISM_DIRECTION: SV
OD_CYLINDER: -3.25
OD_VPRISM_DIRECTION: SV
OD_VPRISM_DIRECTION: SV
OD_SPHERE: PLANO
OD_AXIS: 114
OS_CYLINDER: +0.75
OD_OVR_VA: 20/
OD_CYLINDER: +3.25
OD_AXIS: 026
OD_AXIS: 115
OD_OVR_VA: 20/
OS_SPHERE: +2.75
OD_SPHERE: +3.00
OD_OVR_VA: 20/
OS_SPHERE: +4.00

## 2023-10-30 ASSESSMENT — VISUAL ACUITY
OD_BCVA: 20/50+2
OS_BCVA: 20/100

## 2023-10-30 ASSESSMENT — REFRACTION_AUTOREFRACTION
OD_SPHERE: -3.00
OD_AXIS: 101
OS_CYLINDER: --1.00
OD_CYLINDER: -3.25
OS_AXIS: 076
OS_SPHERE: +1.50

## 2023-10-30 ASSESSMENT — SPHEQUIV_DERIVED
OS_SPHEQUIV: 1.125
OS_SPHEQUIV: 0.625
OD_SPHEQUIV: -1.375
OD_SPHEQUIV: -4.625
OS_SPHEQUIV: 0.875

## 2023-10-30 ASSESSMENT — REFRACTION_MANIFEST
OS_CYLINDER: +0.75
OS_AXIS: 160
OD_AXIS: 025
OS_VA2: 20/30+2
OD_ADD: +3.25
OS_SPHERE: +0.25
OD_SPHERE: -3.00
OS_ADD: +3.00
OS_ADD: +3.25
OD_ADD: +3.00
OS_VA1: 20/25
OS_SPHERE: +0.50
OD_SPHERE: UNABLE
OD_VA1: 20/40
OD_CYLINDER: +3.25
OS_SPHERE: +0.75
OS_AXIS: 155
OD_VA2: 20/30+2
OD_VA1: 20/NI
OS_VA1: 20/30-2
OS_VA1: 20/30
OS_CYLINDER: +0.75
OD_SPHERE: PLANO
OS_AXIS: 155
OS_CYLINDER: +0.75

## 2023-10-30 ASSESSMENT — AXIALLENGTH_DERIVED
OD_AL: 22.7792
OS_AL: 22.5264
OD_AL: 24.0242
OS_AL: 22.3502
OS_AL: 22.438

## 2023-10-30 ASSESSMENT — KERATOMETRY
METHOD_AUTO_MANUAL: AUTO
OS_K2POWER_DIOPTERS: 46.00
OD_K2POWER_DIOPTERS: 48.75
OD_AXISANGLE_DEGREES: 033
OS_AXISANGLE_DEGREES: 024
OD_K1POWER_DIOPTERS: 45.75
OS_K1POWER_DIOPTERS: 45.75

## 2024-01-28 ENCOUNTER — OFFICE (OUTPATIENT)
Dept: URBAN - METROPOLITAN AREA CLINIC 90 | Facility: CLINIC | Age: 79
Setting detail: OPHTHALMOLOGY
End: 2024-01-28
Payer: MEDICARE

## 2024-01-28 ENCOUNTER — RX ONLY (RX ONLY)
Age: 79
End: 2024-01-28

## 2024-01-28 DIAGNOSIS — H18.451: ICD-10-CM

## 2024-01-28 DIAGNOSIS — H35.3132: ICD-10-CM

## 2024-01-28 DIAGNOSIS — H52.7: ICD-10-CM

## 2024-01-28 DIAGNOSIS — H04.123: ICD-10-CM

## 2024-01-28 DIAGNOSIS — Z96.1: ICD-10-CM

## 2024-01-28 DIAGNOSIS — H16.223: ICD-10-CM

## 2024-01-28 DIAGNOSIS — H52.4: ICD-10-CM

## 2024-01-28 DIAGNOSIS — H18.231: ICD-10-CM

## 2024-01-28 PROCEDURE — 92012 INTRM OPH EXAM EST PATIENT: CPT | Performed by: OPHTHALMOLOGY

## 2024-01-28 PROCEDURE — 92015 DETERMINE REFRACTIVE STATE: CPT | Performed by: OPHTHALMOLOGY

## 2024-01-28 ASSESSMENT — REFRACTION_CURRENTRX
OD_SPHERE: +0.25
OD_CYLINDER: -3.25
OD_AXIS: 114
OS_AXIS: 146
OD_VPRISM_DIRECTION: SV
OS_AXIS: 059
OD_VPRISM_DIRECTION: SV
OS_VPRISM_DIRECTION: SV
OD_OVR_VA: 20/
OS_SPHERE: +2.75
OD_OVR_VA: 20/
OD_VPRISM_DIRECTION: SV
OS_VPRISM_DIRECTION: SV
OD_OVR_VA: 20/
OS_CYLINDER: +0.75
OS_SPHERE: +1.00
OD_AXIS: 114
OS_VPRISM_DIRECTION: SV
OD_CYLINDER: -3.25
OS_VPRISM_DIRECTION: SV
OS_OVR_VA: 20/
OS_SPHERE: +4.00
OD_SPHERE: PLANO
OS_AXIS: 061
OS_VPRISM_DIRECTION: SV
OS_CYLINDER: -0.75
OS_CYLINDER: SPH
OS_SPHERE: PLANO
OD_SPHERE: PLANO
OD_SPHERE: +3.25
OS_OVR_VA: 20/
OS_SPHERE: +3.25
OS_OVR_VA: 20/
OD_AXIS: 026
OD_VPRISM_DIRECTION: SV
OD_VPRISM_DIRECTION: SV
OD_AXIS: 115
OD_CYLINDER: -4.00
OD_SPHERE: +3.00
OD_CYLINDER: -3.00
OD_CYLINDER: +3.25
OS_CYLINDER: -0.75
OD_AXIS: 112

## 2024-01-28 ASSESSMENT — REFRACTION_MANIFEST
OD_CYLINDER: +3.25
OS_SPHERE: +1.25
OS_ADD: +3.00
OS_CYLINDER: +0.75
OD_SPHERE: -3.00
OD_VA2: 20/30+2
OD_ADD: +3.25
OS_ADD: +3.00
OD_SPHERE: UNABLE
OD_VA1: 20/40
OD_VA1: 20/NI
OD_SPHERE: PLANO
OD_ADD: +3.00
OS_SPHERE: +1.25
OS_AXIS: 065
OS_AXIS: 155
OS_VA1: 20/30-2
OS_SPHERE: +0.25
OS_CYLINDER: -0.75
OS_CYLINDER: +0.75
OS_ADD: +3.25
OD_SPHERE: BALANCE
OS_AXIS: 160
OS_AXIS: 080
OS_VA1: 20/30
OS_SPHERE: +0.75
OS_VA1: 20/25
OS_CYLINDER: -1.00
OS_VA2: 20/30+2
OD_AXIS: 025

## 2024-01-28 ASSESSMENT — CORNEAL EDEMA CLINICAL DESCRIPTION: OD_CORNEALEDEMA: DEEP FOLDS

## 2024-01-28 ASSESSMENT — SUPERFICIAL PUNCTATE KERATITIS (SPK)
OS_SPK: 3+
OD_SPK: 3+

## 2024-01-28 ASSESSMENT — REFRACTION_AUTOREFRACTION
OD_SPHERE: ERR
OS_CYLINDER: -1.00
OS_AXIS: 082
OS_SPHERE: +1.50

## 2024-01-28 ASSESSMENT — TEAR BREAK UP TIME (TBUT)
OD_TBUT: T 1+
OS_TBUT: T 1+

## 2024-01-28 ASSESSMENT — SPHEQUIV_DERIVED
OS_SPHEQUIV: 0.75
OS_SPHEQUIV: 1.125
OS_SPHEQUIV: 0.875
OD_SPHEQUIV: -1.375
OS_SPHEQUIV: 1
OS_SPHEQUIV: 0.625

## 2024-01-28 ASSESSMENT — CORNEAL EDEMA - FOLDS/STRIAE: OD_FOLDSSTRIAE: 1+ 2+

## 2024-03-06 ENCOUNTER — APPOINTMENT (OUTPATIENT)
Dept: CARDIOLOGY | Facility: CLINIC | Age: 79
End: 2024-03-06
Payer: MEDICARE

## 2024-03-06 ENCOUNTER — NON-APPOINTMENT (OUTPATIENT)
Age: 79
End: 2024-03-06

## 2024-03-06 ENCOUNTER — APPOINTMENT (OUTPATIENT)
Dept: INTERNAL MEDICINE | Facility: CLINIC | Age: 79
End: 2024-03-06
Payer: MEDICARE

## 2024-03-06 VITALS
BODY MASS INDEX: 28.43 KG/M2 | WEIGHT: 141 LBS | HEART RATE: 74 BPM | OXYGEN SATURATION: 98 % | DIASTOLIC BLOOD PRESSURE: 79 MMHG | HEIGHT: 59 IN | RESPIRATION RATE: 12 BRPM | SYSTOLIC BLOOD PRESSURE: 125 MMHG | TEMPERATURE: 96.1 F

## 2024-03-06 DIAGNOSIS — E03.9 HYPOTHYROIDISM, UNSPECIFIED: ICD-10-CM

## 2024-03-06 DIAGNOSIS — E78.00 PURE HYPERCHOLESTEROLEMIA, UNSPECIFIED: ICD-10-CM

## 2024-03-06 DIAGNOSIS — I10 ESSENTIAL (PRIMARY) HYPERTENSION: ICD-10-CM

## 2024-03-06 DIAGNOSIS — M25.561 PAIN IN RIGHT KNEE: ICD-10-CM

## 2024-03-06 DIAGNOSIS — I49.9 CARDIAC ARRHYTHMIA, UNSPECIFIED: ICD-10-CM

## 2024-03-06 PROCEDURE — 99214 OFFICE O/P EST MOD 30 MIN: CPT

## 2024-03-06 PROCEDURE — 93000 ELECTROCARDIOGRAM COMPLETE: CPT

## 2024-03-06 PROCEDURE — G2211 COMPLEX E/M VISIT ADD ON: CPT

## 2024-03-06 PROCEDURE — 93306 TTE W/DOPPLER COMPLETE: CPT

## 2024-03-26 ENCOUNTER — RX ONLY (RX ONLY)
Age: 79
End: 2024-03-26

## 2024-03-26 ENCOUNTER — OFFICE (OUTPATIENT)
Dept: URBAN - METROPOLITAN AREA CLINIC 90 | Facility: CLINIC | Age: 79
Setting detail: OPHTHALMOLOGY
End: 2024-03-26
Payer: MEDICARE

## 2024-03-26 DIAGNOSIS — H04.123: ICD-10-CM

## 2024-03-26 DIAGNOSIS — H16.223: ICD-10-CM

## 2024-03-26 DIAGNOSIS — H18.451: ICD-10-CM

## 2024-03-26 PROBLEM — H18.592 UNSPECIFIED HEREDITARY CORNEAL DYSTROPHIES ; LEFT EYE: Status: ACTIVE | Noted: 2024-03-26

## 2024-03-26 PROBLEM — H18.593 UNSPECIFIED HEREDITARY CORNEAL DYSTROPHIES ; LEFT EYE: Status: ACTIVE | Noted: 2024-03-26

## 2024-03-26 PROBLEM — H18.591 UNSPECIFIED HEREDITARY CORNEAL DYSTROPHIES ; LEFT EYE: Status: ACTIVE | Noted: 2024-03-26

## 2024-03-26 PROCEDURE — 99213 OFFICE O/P EST LOW 20 MIN: CPT | Performed by: OPHTHALMOLOGY

## 2024-03-28 ASSESSMENT — REFRACTION_MANIFEST
OS_SPHERE: +0.25
OS_CYLINDER: -0.75
OD_VA1: 20/NI
OS_AXIS: 080
OS_VA2: 20/30+2
OS_VA1: 20/30
OS_AXIS: 155
OD_VA1: 20/40
OS_CYLINDER: +0.75
OS_ADD: +3.25
OD_ADD: +3.25
OD_VA2: 20/30+2
OD_CYLINDER: +3.25
OS_ADD: +3.00
OS_CYLINDER: +0.75
OD_SPHERE: PLANO
OD_SPHERE: BALANCE
OS_VA1: 20/30-2
OS_SPHERE: +0.75
OD_SPHERE: -3.00
OS_SPHERE: +1.25
OS_VA1: 20/25
OD_SPHERE: UNABLE
OD_ADD: +3.00
OS_SPHERE: +1.25
OS_AXIS: 160
OS_AXIS: 065
OD_AXIS: 025
OS_CYLINDER: -1.00
OS_ADD: +3.00

## 2024-03-28 ASSESSMENT — SPHEQUIV_DERIVED
OS_SPHEQUIV: 0.625
OS_SPHEQUIV: 1.125
OD_SPHEQUIV: -1.375
OS_SPHEQUIV: 0.875
OS_SPHEQUIV: 0.75

## 2024-03-28 ASSESSMENT — REFRACTION_CURRENTRX
OS_AXIS: 146
OD_SPHERE: PLANO
OS_OVR_VA: 20/
OS_SPHERE: +2.75
OD_AXIS: 026
OD_AXIS: 115
OD_OVR_VA: 20/
OD_AXIS: 114
OS_CYLINDER: SPH
OS_VPRISM_DIRECTION: SV
OD_CYLINDER: -3.00
OS_VPRISM_DIRECTION: SV
OD_VPRISM_DIRECTION: SV
OD_SPHERE: +3.00
OD_VPRISM_DIRECTION: SV
OS_OVR_VA: 20/
OD_AXIS: 112
OS_AXIS: 061
OD_CYLINDER: -3.25
OD_CYLINDER: -3.25
OD_VPRISM_DIRECTION: SV
OD_AXIS: 114
OS_CYLINDER: +0.75
OS_AXIS: 059
OD_OVR_VA: 20/
OS_SPHERE: PLANO
OS_CYLINDER: -0.75
OD_SPHERE: +3.25
OS_VPRISM_DIRECTION: SV
OS_OVR_VA: 20/
OD_SPHERE: PLANO
OD_CYLINDER: -4.00
OS_SPHERE: +1.00
OS_VPRISM_DIRECTION: SV
OD_SPHERE: +0.25
OS_VPRISM_DIRECTION: SV
OS_SPHERE: +3.25
OS_CYLINDER: -0.75
OD_VPRISM_DIRECTION: SV
OD_CYLINDER: +3.25
OD_OVR_VA: 20/
OD_VPRISM_DIRECTION: SV
OS_SPHERE: +4.00

## 2024-04-03 ENCOUNTER — APPOINTMENT (OUTPATIENT)
Dept: INTERNAL MEDICINE | Facility: CLINIC | Age: 79
End: 2024-04-03
Payer: MEDICARE

## 2024-04-03 ENCOUNTER — LABORATORY RESULT (OUTPATIENT)
Age: 79
End: 2024-04-03

## 2024-04-03 VITALS
HEART RATE: 96 BPM | SYSTOLIC BLOOD PRESSURE: 124 MMHG | OXYGEN SATURATION: 98 % | DIASTOLIC BLOOD PRESSURE: 80 MMHG | BODY MASS INDEX: 28.63 KG/M2 | WEIGHT: 142 LBS | RESPIRATION RATE: 14 BRPM | HEIGHT: 59 IN | TEMPERATURE: 97.7 F

## 2024-04-03 DIAGNOSIS — Z00.00 ENCOUNTER FOR GENERAL ADULT MEDICAL EXAMINATION W/OUT ABNORMAL FINDINGS: ICD-10-CM

## 2024-04-03 LAB
ALBUMIN SERPL ELPH-MCNC: 4.2 G/DL
ALP BLD-CCNC: 91 U/L
ALT SERPL-CCNC: 16 U/L
ANION GAP SERPL CALC-SCNC: 13 MMOL/L
AST SERPL-CCNC: 23 U/L
BILIRUB SERPL-MCNC: 0.2 MG/DL
BUN SERPL-MCNC: 17 MG/DL
CALCIUM SERPL-MCNC: 9.5 MG/DL
CHLORIDE SERPL-SCNC: 101 MMOL/L
CHOLEST SERPL-MCNC: 190 MG/DL
CO2 SERPL-SCNC: 25 MMOL/L
CREAT SERPL-MCNC: 1.07 MG/DL
EGFR: 54 ML/MIN/1.73M2
GLUCOSE SERPL-MCNC: 83 MG/DL
HDLC SERPL-MCNC: 45 MG/DL
LDLC SERPL CALC-MCNC: 78 MG/DL
NONHDLC SERPL-MCNC: 146 MG/DL
POTASSIUM SERPL-SCNC: 4.5 MMOL/L
PROT SERPL-MCNC: 6.9 G/DL
SODIUM SERPL-SCNC: 140 MMOL/L
TRIGL SERPL-MCNC: 421 MG/DL
TSH SERPL-ACNC: 3.84 UIU/ML

## 2024-04-03 PROCEDURE — G0439: CPT

## 2024-04-03 PROCEDURE — G0444 DEPRESSION SCREEN ANNUAL: CPT | Mod: 59

## 2024-04-04 LAB
25(OH)D3 SERPL-MCNC: 53.8 NG/ML
ALBUMIN SERPL ELPH-MCNC: 4.1 G/DL
ALP BLD-CCNC: 77 U/L
ALT SERPL-CCNC: 17 U/L
ANION GAP SERPL CALC-SCNC: 12 MMOL/L
APPEARANCE: CLEAR
AST SERPL-CCNC: 28 U/L
BASOPHILS # BLD AUTO: 0.04 K/UL
BASOPHILS NFR BLD AUTO: 0.6 %
BILIRUB SERPL-MCNC: 0.2 MG/DL
BILIRUBIN URINE: NEGATIVE
BLOOD URINE: NEGATIVE
BUN SERPL-MCNC: 15 MG/DL
CALCIUM SERPL-MCNC: 9.3 MG/DL
CHLORIDE SERPL-SCNC: 103 MMOL/L
CHOLEST SERPL-MCNC: 178 MG/DL
CO2 SERPL-SCNC: 26 MMOL/L
COLOR: YELLOW
CREAT SERPL-MCNC: 1.12 MG/DL
EGFR: 51 ML/MIN/1.73M2
EOSINOPHIL # BLD AUTO: 0.12 K/UL
EOSINOPHIL NFR BLD AUTO: 1.7 %
ESTIMATED AVERAGE GLUCOSE: 108 MG/DL
GLUCOSE QUALITATIVE U: NEGATIVE MG/DL
GLUCOSE SERPL-MCNC: 66 MG/DL
HBA1C MFR BLD HPLC: 5.4 %
HCT VFR BLD CALC: 39.4 %
HDLC SERPL-MCNC: 51 MG/DL
HGB BLD-MCNC: 12.5 G/DL
IMM GRANULOCYTES NFR BLD AUTO: 0.1 %
KETONES URINE: NEGATIVE MG/DL
LDLC SERPL CALC-MCNC: 84 MG/DL
LEUKOCYTE ESTERASE URINE: ABNORMAL
LYMPHOCYTES # BLD AUTO: 1.95 K/UL
LYMPHOCYTES NFR BLD AUTO: 27.9 %
MAN DIFF?: NORMAL
MCHC RBC-ENTMCNC: 29.6 PG
MCHC RBC-ENTMCNC: 31.7 GM/DL
MCV RBC AUTO: 93.4 FL
MONOCYTES # BLD AUTO: 0.69 K/UL
MONOCYTES NFR BLD AUTO: 9.9 %
NEUTROPHILS # BLD AUTO: 4.19 K/UL
NEUTROPHILS NFR BLD AUTO: 59.8 %
NITRITE URINE: NEGATIVE
NONHDLC SERPL-MCNC: 127 MG/DL
PH URINE: 5.5
PLATELET # BLD AUTO: 223 K/UL
POTASSIUM SERPL-SCNC: 4.8 MMOL/L
PROT SERPL-MCNC: 7.2 G/DL
PROTEIN URINE: NEGATIVE MG/DL
RBC # BLD: 4.22 M/UL
RBC # FLD: 13 %
SODIUM SERPL-SCNC: 141 MMOL/L
SPECIFIC GRAVITY URINE: 1.02
TRIGL SERPL-MCNC: 257 MG/DL
TSH SERPL-ACNC: 3.02 UIU/ML
UROBILINOGEN URINE: 0.2 MG/DL
VIT B12 SERPL-MCNC: 668 PG/ML
WBC # FLD AUTO: 7 K/UL

## 2024-04-28 ENCOUNTER — OFFICE (OUTPATIENT)
Dept: URBAN - METROPOLITAN AREA CLINIC 90 | Facility: CLINIC | Age: 79
Setting detail: OPHTHALMOLOGY
End: 2024-04-28
Payer: MEDICARE

## 2024-04-28 ENCOUNTER — RX ONLY (RX ONLY)
Age: 79
End: 2024-04-28

## 2024-04-28 DIAGNOSIS — H18.451: ICD-10-CM

## 2024-04-28 DIAGNOSIS — H04.123: ICD-10-CM

## 2024-04-28 DIAGNOSIS — H16.223: ICD-10-CM

## 2024-04-28 PROBLEM — Z96.1 PSEUDOPHAKIA: Status: ACTIVE | Noted: 2024-04-28

## 2024-04-28 PROBLEM — H18.593 UNSPECIFIED HEREDITARY CORNEAL DYSTROPHIES ; LEFT EYE: Status: ACTIVE | Noted: 2024-04-28

## 2024-04-28 PROBLEM — H18.592 UNSPECIFIED HEREDITARY CORNEAL DYSTROPHIES ; LEFT EYE: Status: ACTIVE | Noted: 2024-04-28

## 2024-04-28 PROBLEM — H18.591 UNSPECIFIED HEREDITARY CORNEAL DYSTROPHIES ; LEFT EYE: Status: ACTIVE | Noted: 2024-04-28

## 2024-04-28 PROCEDURE — 92012 INTRM OPH EXAM EST PATIENT: CPT | Performed by: OPHTHALMOLOGY

## 2024-05-06 NOTE — PHYSICAL EXAM
[No Acute Distress] : no acute distress [Well Nourished] : well nourished [Well-Appearing] : well-appearing [Well Developed] : well developed [Normal Sclera/Conjunctiva] : normal sclera/conjunctiva [Normal Outer Ear/Nose] : the outer ears and nose were normal in appearance [No JVD] : no jugular venous distention [Supple] : supple [No Lymphadenopathy] : no lymphadenopathy [Thyroid Normal, No Nodules] : the thyroid was normal and there were no nodules present [No Respiratory Distress] : no respiratory distress  [No Accessory Muscle Use] : no accessory muscle use [Clear to Auscultation] : lungs were clear to auscultation bilaterally [Normal Rate] : normal rate  [Regular Rhythm] : with a regular rhythm [Normal S1, S2] : normal S1 and S2 [No Murmur] : no murmur heard [No Carotid Bruits] : no carotid bruits [No Varicosities] : no varicosities [Pedal Pulses Present] : the pedal pulses are present [No Edema] : there was no peripheral edema [No Extremity Clubbing/Cyanosis] : no extremity clubbing/cyanosis [Soft] : abdomen soft [Non Tender] : non-tender [Non-distended] : non-distended [No Masses] : no abdominal mass palpated [Normal Posterior Cervical Nodes] : no posterior cervical lymphadenopathy [Normal Anterior Cervical Nodes] : no anterior cervical lymphadenopathy [No Spinal Tenderness] : no spinal tenderness [No CVA Tenderness] : no CVA  tenderness [No Joint Swelling] : no joint swelling [Grossly Normal Strength/Tone] : grossly normal strength/tone [Coordination Grossly Intact] : coordination grossly intact [No Rash] : no rash [No Focal Deficits] : no focal deficits [Normal Gait] : normal gait [Deep Tendon Reflexes (DTR)] : deep tendon reflexes were 2+ and symmetric [Normal Affect] : the affect was normal [Normal Insight/Judgement] : insight and judgment were intact [PERRL] : pupils equal round and reactive to light [EOMI] : extraocular movements intact [Normal Bowel Sounds] : normal bowel sounds

## 2024-05-06 NOTE — HISTORY OF PRESENT ILLNESS
[de-identified] : Ms. AHMET JEROME is a 79 year old female accompanied by her daughter, with a Hx of SVT, hypothyroid, hypercholesterolemia, osteoporosis, who presents for an annual physical, Rx refills.   Pt's daughter states that pt goes for PT 3x a week for her knee, back, and leg pain. She c/o pain today. Denies any SOB, CP, abdominal pain, N/V/D, headache, dizziness, or leg swelling. Reports compliance with taking her meds daily.  Daughter states that pt has followed up with ophthalmologist for her eye pain and was advised to continue using her current eye drop.

## 2024-05-06 NOTE — ADDENDUM
[FreeTextEntry1] : I, Ileana Hopper, am scribing for and in the presence of Dr. Nikia Fountain, DO in the following sections HISTORY OF PRESENT ILLNESS, PAST MEDICAL/FAMILY/SOCIAL HISTORY; REVIEW OF SYSTEMS; VITAL SIGNS; PHYSICAL EXAM; ASSESSMENT/PLAN on  03/06/2024.  I personally performed the services described in the documentation, reviewed the documentation recorded by the scribe in my presence, and it accurately and completely records my words and actions.

## 2024-05-06 NOTE — HEALTH RISK ASSESSMENT
[No] : No [Little interest or pleasure doing things] : 1) Little interest or pleasure doing things [Feeling down, depressed, or hopeless] : 2) Feeling down, depressed, or hopeless [0] : 2) Feeling down, depressed, or hopeless: Not at all (0) [PHQ-2 Negative - No further assessment needed] : PHQ-2 Negative - No further assessment needed [Patient reported colonoscopy was normal] : Patient reported colonoscopy was normal [With Family] : lives with family [None] : None [# Of Children ___] : has [unfilled] children [] :  [Fully functional (bathing, dressing, toileting, transferring, walking, feeding)] : Fully functional (bathing, dressing, toileting, transferring, walking, feeding) [Feels Safe at Home] : Feels safe at home [Fully functional (using the telephone, shopping, preparing meals, housekeeping, doing laundry, using] : Fully functional and needs no help or supervision to perform IADLs (using the telephone, shopping, preparing meals, housekeeping, doing laundry, using transportation, managing medications and managing finances) [Smoke Detector] : smoke detector [Carbon Monoxide Detector] : carbon monoxide detector [Seat Belt] :  uses seat belt [Never] : Never [No falls in past year] : Patient reported no falls in the past year [[id number]] : 0 [Change in mental status noted] : No change in mental status noted [Language] : denies difficulty with language [Behavior] : denies difficulty with behavior [Learning/Retaining New Information] : denies difficulty learning/retaining new information [Handling Complex Tasks] : denies difficulty handling complex tasks [Reasoning] : denies difficulty with reasoning [Spatial Ability and Orientation] : denies difficulty with spatial ability and orientation [Reports changes in hearing] : Reports no changes in hearing [Reports changes in dental health] : Reports no changes in dental health [Guns at Home] : no guns at home [Travel to Developing Areas] : does not  travel to developing areas [TB Exposure] : is not being exposed to tuberculosis [MammogramDate] : 2 years ago [PapSmearDate] : 2 years ago [BoneDensityDate] : 2 years ago [ColonoscopyDate] : 2017 [de-identified] : With daughter

## 2024-05-06 NOTE — HISTORY OF PRESENT ILLNESS
[de-identified] : Ms. AHMET JEROME is a 78 year old female with a Hx of SVT, hypothyroid, hypercholesterolemia, osteoporosis, who is accompanied by her daughter, presents for a follow up visit. According to her daughter, pt has R knee pain and inner eye pain described as needle pricking. She has been compliant with her medications. Denies any SOB, CP, abdominal pain, N/V/D, headache, dizziness, or leg swelling.

## 2024-05-06 NOTE — REVIEW OF SYSTEMS
[Nausea] : no nausea [Diarrhea] : diarrhea [Vomiting] : no vomiting [Negative] : Heme/Lymph [Shortness Of Breath] : no shortness of breath [Headache] : no headache [Dizziness] : no dizziness [FreeTextEntry3] : R eye pain [FreeTextEntry9] : R knee pain

## 2024-05-06 NOTE — ADDENDUM
[FreeTextEntry1] : I, Ileana Hopper, am scribing for and in the presence of Dr. Nikia Fountain, DO in the following sections HISTORY OF PRESENT ILLNESS, PAST MEDICAL/FAMILY/SOCIAL HISTORY; REVIEW OF SYSTEMS; VITAL SIGNS; PHYSICAL EXAM; ASSESSMENT/PLAN on  04/03/2024.  I personally performed the services described in the documentation, reviewed the documentation recorded by the scribe in my presence, and it accurately and completely records my words and actions.

## 2024-05-06 NOTE — ASSESSMENT
[FreeTextEntry1] : Physical  UTD with colonoscopy referral for mammogram, bone density provided  HTN cont Metoprolol 25 mg 1 1/2 tabs daily -renewed today reinforced importance of following low sodium, low cholesterol diet  Osteopenia cont Alendronate 70 mg once weekly  Hypothyroidism cont Levothyroxine 75 mcg daily -renewed today We'll check TSH/T4 today.  Chronic back pain/Sciatica cont Gabapentin 100 mg TID daily PRN  lab results from last month reviewed and discussed with patient  HLD- Elevated triglycerides cont Atorvastatin 10 mg daily -renewed today reinforced importance of following low cholesterol/low fat diet  meds renewed  Labs ordered.  pt to follow-up in 1 week for results.

## 2024-05-06 NOTE — PHYSICAL EXAM
[Well Nourished] : well nourished [No Acute Distress] : no acute distress [Well Developed] : well developed [Well-Appearing] : well-appearing [Normal Sclera/Conjunctiva] : normal sclera/conjunctiva [PERRL] : pupils equal round and reactive to light [EOMI] : extraocular movements intact [Normal Outer Ear/Nose] : the outer ears and nose were normal in appearance [Normal Oropharynx] : the oropharynx was normal [Normal TMs] : both tympanic membranes were normal [No JVD] : no jugular venous distention [Supple] : supple [No Lymphadenopathy] : no lymphadenopathy [Thyroid Normal, No Nodules] : the thyroid was normal and there were no nodules present [No Respiratory Distress] : no respiratory distress  [No Accessory Muscle Use] : no accessory muscle use [Clear to Auscultation] : lungs were clear to auscultation bilaterally [Regular Rhythm] : with a regular rhythm [Normal Rate] : normal rate  [Normal S1, S2] : normal S1 and S2 [Pedal Pulses Present] : the pedal pulses are present [No Edema] : there was no peripheral edema [No Extremity Clubbing/Cyanosis] : no extremity clubbing/cyanosis [Soft] : abdomen soft [Non Tender] : non-tender [Non-distended] : non-distended [No Masses] : no abdominal mass palpated [No HSM] : no HSM [Normal Bowel Sounds] : normal bowel sounds [Normal Posterior Cervical Nodes] : no posterior cervical lymphadenopathy [Normal Anterior Cervical Nodes] : no anterior cervical lymphadenopathy [No CVA Tenderness] : no CVA  tenderness [No Spinal Tenderness] : no spinal tenderness [No Joint Swelling] : no joint swelling [Grossly Normal Strength/Tone] : grossly normal strength/tone [No Rash] : no rash [Coordination Grossly Intact] : coordination grossly intact [No Focal Deficits] : no focal deficits [Normal Gait] : normal gait [Normal Affect] : the affect was normal [Normal Insight/Judgement] : insight and judgment were intact [de-identified] : + II/VI murmur

## 2024-05-06 NOTE — ASSESSMENT
[FreeTextEntry1] : Hypercholesterolemia  cont Atorvastatin 10mg daily We'll check lipids and LFT's today  rt eye pain f/u with ophthalmologist  HTN cont Metoprolol 25 mg 1 1/2 tabs daily Reinforced the importance of following a low sodium diet  Hypothyroidism cont Levothyroxine 75mcg daily We'll check TSH today  Chronic back pain/Sciatica cont Gabapentin 100 mg TID daily PRN  R knee pain -arthritis Referred for physical therapy  Osteopenia cont  Alendronate 70 mg once weekly- renewed today  blood work ordered follow up in one week for lab results

## 2024-05-29 ENCOUNTER — Encounter (OUTPATIENT)
Dept: URBAN - METROPOLITAN AREA CLINIC 90 | Facility: CLINIC | Age: 79
Setting detail: OPHTHALMOLOGY
End: 2024-05-29
Payer: MEDICARE

## 2024-06-03 NOTE — CARDIOLOGY SUMMARY
[___] : [unfilled] [LVEF ___%] : LVEF [unfilled]% [None] : no pulmonary hypertension [de-identified] : 3/6/2024: Endocardium not well visualized; grossly normal left ventricular systolic function. EF is approximately 65%. Moderate concentric left ventricular hypertrophy. There is mild (grade 1) left ventricular diastolic dysfunction. Normal right ventricular cavity size and normal systolic function. Thickened pericardium with a small pericardial effusion, measuring approximately 0.9 cm at its largest dimension adjacent to the right ventricle. Mild mitral regurgitation. Mild mitral valve stenosis. Mitral annular calcification with thickened mitral valve leaflets and mildly decreased opening. Calcified aortic valve with decreased opening. The aortic valve area is approximately 1.6 sqcm, by the continuity equation, which is consistent with mild aortic stenosis. No echocardiographic evidence of pulmonary hypertension. Mild tricuspid regurgitation.  PASP= 28 mmHg. Trace aortic regurgitation.    3/8/2022: Mild mitral regurgitation. Mild mitral stenosis. Mild diastolic dysfunction. Mild left ventricular enlargement. Normal left ventricular systolic function with an EF of approximately 70%. Small pericardial effusion. Mild tricuspid regurgitation. No pulmonary HTN. PASP= 30 mmHg.  [de-identified] : 3/6/2024: Sinus Rhythm at 71 bpm with low voltage QRS, and poor R wave progression

## 2024-06-03 NOTE — PHYSICAL EXAM
[General Appearance - Well Developed] : well developed [Normal Appearance] : normal appearance [Well Groomed] : well groomed [General Appearance - Well Nourished] : well nourished [General Appearance - In No Acute Distress] : no acute distress [No Deformities] : no deformities [Normal Conjunctiva] : the conjunctiva exhibited no abnormalities [Normal Oral Mucosa] : normal oral mucosa [No Oral Pallor] : no oral pallor [No Oral Cyanosis] : no oral cyanosis [Normal Jugular Venous A Waves Present] : normal jugular venous A waves present [Normal Jugular Venous V Waves Present] : normal jugular venous V waves present [Respiration, Rhythm And Depth] : normal respiratory rhythm and effort [Exaggerated Use Of Accessory Muscles For Inspiration] : no accessory muscle use [Auscultation Breath Sounds / Voice Sounds] : lungs were clear to auscultation bilaterally [Normal Rate] : normal [Rhythm Regular] : regular [Normal S2] : normal S2 [Normal S1] : normal S1 [II] : a grade 2 [I] : a grade 1 [No Pitting Edema] : no pitting edema present [Bowel Sounds] : normal bowel sounds [Abdomen Soft] : soft [Abnormal Walk] : normal gait [Abdomen Tenderness] : non-tender [Gait - Sufficient For Exercise Testing] : the gait was sufficient for exercise testing [Nail Clubbing] : no clubbing of the fingernails [Cyanosis, Localized] : no localized cyanosis [Petechial Hemorrhages (___cm)] : no petechial hemorrhages [Skin Color & Pigmentation] : normal skin color and pigmentation [No Skin Ulcers] : no skin ulcer [] : no rash [Oriented To Time, Place, And Person] : oriented to person, place, and time [Affect] : the affect was normal [No Anxiety] : not feeling anxious [Mood] : the mood was normal [FreeTextEntry1] : Extraocular muscles intact. Anicteric sclerae.  [S3] : no S3 [Right Carotid Bruit] : no bruit heard over the right carotid [Left Carotid Bruit] : no bruit heard over the left carotid [Bruit] : no bruit heard

## 2024-06-03 NOTE — DISCUSSION/SUMMARY
[FreeTextEntry1] : IMPRESSION: Ms. Dill is a 78 year old woman with a history of hyperlipidemia, mitral regurgitation, hypothyroidism, SVT, and APCs who presents today for follow up of rhythm disorder and hyperlipidemia.  PLAN: 1. There was no ectopy on exam or on her ECG that was performed today, thus she will continue on Toprol XL 37.5mg daily. 2. She had an echocardiogram done earlier today that showed preserved left ventricular ejection fraction with mild mitral regurgitation and mild aortic stenosis. She should have a repeat echocardiogram in 12-18 months. 3. She will continue on Lipitor 10 mg daily along with diet modification. Her most recent LDL and triglycerides were good. She will be having a lipid panel and CMP checked with you today. 4. She will follow up with me in 6 months or sooner should she experience any symptoms in the interim. [EKG obtained to assist in diagnosis and management of assessed problem(s)] : EKG obtained to assist in diagnosis and management of assessed problem(s)

## 2024-06-03 NOTE — HISTORY OF PRESENT ILLNESS
[FreeTextEntry1] : Patient is a 78 year old woman with a history of hyperlipidemia, hypothyroidism, mitral regurgitation, SVT, and APCs who presents today for follow up of her rhythm disorder and hyperlipidemia. She has been feeling well, she denies any chest pain, shortness of breath, palpitations, headaches or dizziness.

## 2024-06-10 ENCOUNTER — APPOINTMENT (OUTPATIENT)
Dept: INTERNAL MEDICINE | Facility: CLINIC | Age: 79
End: 2024-06-10
Payer: MEDICARE

## 2024-06-10 VITALS
TEMPERATURE: 97.6 F | SYSTOLIC BLOOD PRESSURE: 116 MMHG | OXYGEN SATURATION: 95 % | HEIGHT: 59 IN | BODY MASS INDEX: 28.43 KG/M2 | RESPIRATION RATE: 14 BRPM | WEIGHT: 141 LBS | HEART RATE: 92 BPM | DIASTOLIC BLOOD PRESSURE: 74 MMHG

## 2024-06-10 DIAGNOSIS — Z01.818 ENCOUNTER FOR OTHER PREPROCEDURAL EXAMINATION: ICD-10-CM

## 2024-06-10 PROCEDURE — 99214 OFFICE O/P EST MOD 30 MIN: CPT

## 2024-06-10 PROCEDURE — 93000 ELECTROCARDIOGRAM COMPLETE: CPT

## 2024-06-11 LAB
INR PPP: 0.91 RATIO
PT BLD: 10.3 SEC

## 2024-06-12 ENCOUNTER — OFFICE (OUTPATIENT)
Dept: URBAN - METROPOLITAN AREA CLINIC 90 | Facility: CLINIC | Age: 79
Setting detail: OPHTHALMOLOGY
End: 2024-06-12
Payer: MEDICARE

## 2024-06-12 DIAGNOSIS — H18.451: ICD-10-CM

## 2024-06-12 PROCEDURE — N/C NO CHARGE: Performed by: OPHTHALMOLOGY

## 2024-06-12 ASSESSMENT — CONFRONTATIONAL VISUAL FIELD TEST (CVF)
OD_FINDINGS: FULL
OS_FINDINGS: FULL

## 2024-06-13 LAB
ALBUMIN SERPL ELPH-MCNC: 4 G/DL
ALP BLD-CCNC: 91 U/L
ALT SERPL-CCNC: 17 U/L
ANION GAP SERPL CALC-SCNC: 14 MMOL/L
AST SERPL-CCNC: 26 U/L
BASOPHILS # BLD AUTO: 0.05 K/UL
BASOPHILS NFR BLD AUTO: 0.9 %
BILIRUB SERPL-MCNC: 0.2 MG/DL
BUN SERPL-MCNC: 16 MG/DL
CALCIUM SERPL-MCNC: 9.1 MG/DL
CHLORIDE SERPL-SCNC: 101 MMOL/L
CO2 SERPL-SCNC: 24 MMOL/L
CREAT SERPL-MCNC: 0.98 MG/DL
EGFR: 59 ML/MIN/1.73M2
EOSINOPHIL # BLD AUTO: 0.16 K/UL
EOSINOPHIL NFR BLD AUTO: 2.8 %
GLUCOSE SERPL-MCNC: 77 MG/DL
HCT VFR BLD CALC: 40.5 %
HGB BLD-MCNC: 12.7 G/DL
IMM GRANULOCYTES NFR BLD AUTO: 0.2 %
LYMPHOCYTES # BLD AUTO: 1.66 K/UL
LYMPHOCYTES NFR BLD AUTO: 29.4 %
MAN DIFF?: NORMAL
MCHC RBC-ENTMCNC: 30 PG
MCHC RBC-ENTMCNC: 31.4 GM/DL
MCV RBC AUTO: 95.5 FL
MONOCYTES # BLD AUTO: 0.46 K/UL
MONOCYTES NFR BLD AUTO: 8.1 %
NEUTROPHILS # BLD AUTO: 3.31 K/UL
NEUTROPHILS NFR BLD AUTO: 58.6 %
PLATELET # BLD AUTO: 230 K/UL
POTASSIUM SERPL-SCNC: 4.7 MMOL/L
PROT SERPL-MCNC: 6.9 G/DL
RBC # BLD: 4.24 M/UL
RBC # FLD: 13.4 %
SODIUM SERPL-SCNC: 139 MMOL/L
WBC # FLD AUTO: 5.65 K/UL

## 2024-06-13 NOTE — ADDENDUM
[FreeTextEntry1] : Documented by Ileana Hopper acting as a scribe for Dr. Nikia Fountain. 06/10/2024   All medical record entries made by the scribe were at my, Dr. Nikia Fountain, direction and personally dictated by me on 06/10/2024. I have reviewed the chart and agree that the record accurately reflects my personal performance of the history, physical exam, assessment and plan. I have also personally directed, reviewed, and agreed with the chart.

## 2024-06-13 NOTE — HISTORY OF PRESENT ILLNESS
[No Pertinent Pulmonary History] : no history of asthma, COPD, sleep apnea, or smoking [No Adverse Anesthesia Reaction] : no adverse anesthesia reaction in self or family member [Aortic Stenosis] : no aortic stenosis [Coronary Artery Disease] : no coronary artery disease [Recent Myocardial Infarction] : no recent myocardial infarction [Implantable Device/Pacemaker] : no implantable device/pacemaker [Asthma] : no asthma [COPD] : no COPD [Sleep Apnea] : no sleep apnea [Smoker] : not a smoker [Family Member] : no family member with adverse anesthesia reaction/sudden death [Self] : no previous adverse anesthesia reaction [Chronic Anticoagulation] : no chronic anticoagulation [Chronic Kidney Disease] : no chronic kidney disease [Diabetes] : no diabetes [FreeTextEntry1] : rt eye keratectomy [FreeTextEntry2] : 6/17/24 [FreeTextEntry4] : Ms. AHMET JEROME is a 79 year old female accompanied by her daughter, with a Hx of SVT, hypothyroid, hypercholesterolemia, osteoporosis, who presents for preoperative medical clearance.   Pt states she is feeling well. Denies any SOB, CP, abdominal pain, N/V/D, headache, dizziness, or leg swelling. Reports compliance with taking her meds daily.

## 2024-06-13 NOTE — PHYSICAL EXAM

## 2024-06-17 ENCOUNTER — AMBULATORY SURGERY CENTER (OUTPATIENT)
Dept: URBAN - METROPOLITAN AREA SURGERY 25 | Facility: SURGERY | Age: 79
Setting detail: OPHTHALMOLOGY
End: 2024-06-17
Payer: MEDICARE

## 2024-06-17 DIAGNOSIS — H16.109: ICD-10-CM

## 2024-06-17 PROCEDURE — 65435 CURETTE/TREAT CORNEA: CPT | Mod: RT | Performed by: OPHTHALMOLOGY

## 2024-06-18 ENCOUNTER — OFFICE (OUTPATIENT)
Dept: URBAN - METROPOLITAN AREA CLINIC 90 | Facility: CLINIC | Age: 79
Setting detail: OPHTHALMOLOGY
End: 2024-06-18
Payer: MEDICARE

## 2024-06-18 ENCOUNTER — RX ONLY (RX ONLY)
Age: 79
End: 2024-06-18

## 2024-06-18 DIAGNOSIS — Z96.1: ICD-10-CM

## 2024-06-18 PROCEDURE — 99024 POSTOP FOLLOW-UP VISIT: CPT | Performed by: OPHTHALMOLOGY

## 2024-06-18 ASSESSMENT — CONFRONTATIONAL VISUAL FIELD TEST (CVF)
OS_FINDINGS: FULL
OD_FINDINGS: FULL

## 2024-06-21 ENCOUNTER — RX ONLY (RX ONLY)
Age: 79
End: 2024-06-21

## 2024-06-21 ENCOUNTER — OFFICE (OUTPATIENT)
Dept: URBAN - METROPOLITAN AREA CLINIC 90 | Facility: CLINIC | Age: 79
Setting detail: OPHTHALMOLOGY
End: 2024-06-21
Payer: MEDICARE

## 2024-06-21 DIAGNOSIS — H18.451: ICD-10-CM

## 2024-06-21 PROCEDURE — 99024 POSTOP FOLLOW-UP VISIT: CPT | Performed by: OPHTHALMOLOGY

## 2024-06-21 ASSESSMENT — CONFRONTATIONAL VISUAL FIELD TEST (CVF)
OD_FINDINGS: FULL
OS_FINDINGS: FULL

## 2024-06-28 ENCOUNTER — OFFICE (OUTPATIENT)
Dept: URBAN - METROPOLITAN AREA CLINIC 90 | Facility: CLINIC | Age: 79
Setting detail: OPHTHALMOLOGY
End: 2024-06-28
Payer: MEDICARE

## 2024-06-28 ENCOUNTER — RX ONLY (RX ONLY)
Age: 79
End: 2024-06-28

## 2024-06-28 DIAGNOSIS — H18.451: ICD-10-CM

## 2024-06-28 PROBLEM — Z96.1 PSEUDOPHAKIA ; BOTH EYES: Status: ACTIVE | Noted: 2024-06-12

## 2024-06-28 PROBLEM — H18.592 UNSPECIFIED HEREDITARY CORNEAL DYSTROPHIES ; LEFT EYE: Status: ACTIVE | Noted: 2024-06-12

## 2024-06-28 PROBLEM — H18.591 UNSPECIFIED HEREDITARY CORNEAL DYSTROPHIES ; LEFT EYE: Status: ACTIVE | Noted: 2024-06-12

## 2024-06-28 PROBLEM — H18.593 UNSPECIFIED HEREDITARY CORNEAL DYSTROPHIES ; LEFT EYE: Status: ACTIVE | Noted: 2024-06-12

## 2024-06-28 PROBLEM — H16.223 DRY EYE SYNDROME K SICCA; BOTH EYES: Status: ACTIVE | Noted: 2024-06-12

## 2024-06-28 PROCEDURE — 99024 POSTOP FOLLOW-UP VISIT: CPT | Performed by: OPHTHALMOLOGY

## 2024-06-28 ASSESSMENT — CONFRONTATIONAL VISUAL FIELD TEST (CVF)
OS_FINDINGS: FULL
OD_FINDINGS: FULL

## 2024-07-10 ENCOUNTER — RX ONLY (RX ONLY)
Age: 79
End: 2024-07-10

## 2024-07-10 ENCOUNTER — OFFICE (OUTPATIENT)
Dept: URBAN - METROPOLITAN AREA CLINIC 90 | Facility: CLINIC | Age: 79
Setting detail: OPHTHALMOLOGY
End: 2024-07-10
Payer: MEDICARE

## 2024-07-10 DIAGNOSIS — H18.591: ICD-10-CM

## 2024-07-10 DIAGNOSIS — H35.54: ICD-10-CM

## 2024-07-10 DIAGNOSIS — H18.231: ICD-10-CM

## 2024-07-10 DIAGNOSIS — H04.123: ICD-10-CM

## 2024-07-10 DIAGNOSIS — H18.451: ICD-10-CM

## 2024-07-10 DIAGNOSIS — H18.593: ICD-10-CM

## 2024-07-10 DIAGNOSIS — H18.592: ICD-10-CM

## 2024-07-10 DIAGNOSIS — H35.3132: ICD-10-CM

## 2024-07-10 DIAGNOSIS — H43.392: ICD-10-CM

## 2024-07-10 DIAGNOSIS — Z96.1: ICD-10-CM

## 2024-07-10 PROCEDURE — 92014 COMPRE OPH EXAM EST PT 1/>: CPT | Performed by: OPHTHALMOLOGY

## 2024-07-10 ASSESSMENT — CONFRONTATIONAL VISUAL FIELD TEST (CVF)
OD_FINDINGS: FULL
OS_FINDINGS: FULL

## 2024-08-14 ENCOUNTER — OFFICE (OUTPATIENT)
Dept: URBAN - METROPOLITAN AREA CLINIC 90 | Facility: CLINIC | Age: 79
Setting detail: OPHTHALMOLOGY
End: 2024-08-14
Payer: MEDICARE

## 2024-08-14 DIAGNOSIS — H35.3132: ICD-10-CM

## 2024-08-14 DIAGNOSIS — H10.532: ICD-10-CM

## 2024-08-14 DIAGNOSIS — H35.54: ICD-10-CM

## 2024-08-14 DIAGNOSIS — H43.392: ICD-10-CM

## 2024-08-14 DIAGNOSIS — H16.223: ICD-10-CM

## 2024-08-14 DIAGNOSIS — H04.123: ICD-10-CM

## 2024-08-14 DIAGNOSIS — H18.592: ICD-10-CM

## 2024-08-14 DIAGNOSIS — H18.593: ICD-10-CM

## 2024-08-14 DIAGNOSIS — Z96.1: ICD-10-CM

## 2024-08-14 DIAGNOSIS — H18.451: ICD-10-CM

## 2024-08-14 DIAGNOSIS — H18.591: ICD-10-CM

## 2024-08-14 PROCEDURE — 92012 INTRM OPH EXAM EST PATIENT: CPT | Performed by: OPHTHALMOLOGY

## 2024-08-14 ASSESSMENT — LID EXAM ASSESSMENTS: OS_COMMENTS: DERMATITIS

## 2024-09-04 ENCOUNTER — APPOINTMENT (OUTPATIENT)
Dept: CARDIOLOGY | Facility: CLINIC | Age: 79
End: 2024-09-04
Payer: MEDICARE

## 2024-09-04 ENCOUNTER — NON-APPOINTMENT (OUTPATIENT)
Age: 79
End: 2024-09-04

## 2024-09-04 VITALS — HEART RATE: 65 BPM | OXYGEN SATURATION: 98 %

## 2024-09-04 VITALS — DIASTOLIC BLOOD PRESSURE: 70 MMHG | SYSTOLIC BLOOD PRESSURE: 122 MMHG

## 2024-09-04 DIAGNOSIS — I49.9 CARDIAC ARRHYTHMIA, UNSPECIFIED: ICD-10-CM

## 2024-09-04 PROCEDURE — G2211 COMPLEX E/M VISIT ADD ON: CPT

## 2024-09-04 PROCEDURE — 93000 ELECTROCARDIOGRAM COMPLETE: CPT

## 2024-09-04 PROCEDURE — 99214 OFFICE O/P EST MOD 30 MIN: CPT

## 2024-09-09 NOTE — DISCUSSION/SUMMARY
[FreeTextEntry1] : IMPRESSION: Ms. Dill is a 79 year old woman with a history of hyperlipidemia, mitral regurgitation, hypothyroidism, SVT, and APCs who presents today for follow up of rhythm disorder and hyperlipidemia.  PLAN: 1. There was no ectopy on exam or on her ECG that was performed today, thus she will continue on Toprol XL 37.5mg daily. 2. She had an echocardiogram done 6 months ago that showed preserved left ventricular ejection fraction with mild mitral regurgitation and mild aortic stenosis. She should have a repeat echocardiogram in 6-12 months. 3. She will continue on Lipitor 10 mg daily along with diet modification. Her most recent LDL from 5 months ago was good, however, her triglycerides were elevated. She wants to follow up with you for repeat blood work.  4. She will follow up with me in 6 months or sooner should she experience any symptoms in the interim. [EKG obtained to assist in diagnosis and management of assessed problem(s)] : EKG obtained to assist in diagnosis and management of assessed problem(s)

## 2024-09-09 NOTE — PHYSICAL EXAM
[General Appearance - Well Developed] : well developed [Normal Appearance] : normal appearance [Well Groomed] : well groomed [General Appearance - Well Nourished] : well nourished [No Deformities] : no deformities [General Appearance - In No Acute Distress] : no acute distress [Normal Conjunctiva] : the conjunctiva exhibited no abnormalities [Normal Oral Mucosa] : normal oral mucosa [No Oral Pallor] : no oral pallor [No Oral Cyanosis] : no oral cyanosis [Normal Jugular Venous A Waves Present] : normal jugular venous A waves present [Normal Jugular Venous V Waves Present] : normal jugular venous V waves present [Respiration, Rhythm And Depth] : normal respiratory rhythm and effort [Exaggerated Use Of Accessory Muscles For Inspiration] : no accessory muscle use [Auscultation Breath Sounds / Voice Sounds] : lungs were clear to auscultation bilaterally [Normal Rate] : normal [Rhythm Regular] : regular [Normal S1] : normal S1 [Normal S2] : normal S2 [II] : a grade 2 [I] : a grade 1 [No Pitting Edema] : no pitting edema present [Bowel Sounds] : normal bowel sounds [Abdomen Soft] : soft [Abdomen Tenderness] : non-tender [Abnormal Walk] : normal gait [Gait - Sufficient For Exercise Testing] : the gait was sufficient for exercise testing [Nail Clubbing] : no clubbing of the fingernails [Cyanosis, Localized] : no localized cyanosis [Petechial Hemorrhages (___cm)] : no petechial hemorrhages [Skin Color & Pigmentation] : normal skin color and pigmentation [] : no rash [No Skin Ulcers] : no skin ulcer [Oriented To Time, Place, And Person] : oriented to person, place, and time [Affect] : the affect was normal [Mood] : the mood was normal [No Anxiety] : not feeling anxious [FreeTextEntry1] : Extraocular muscles intact. Anicteric sclerae.  [S3] : no S3 [Right Carotid Bruit] : no bruit heard over the right carotid [Left Carotid Bruit] : no bruit heard over the left carotid [Bruit] : no bruit heard

## 2024-09-09 NOTE — CARDIOLOGY SUMMARY
[___] : [unfilled] [LVEF ___%] : LVEF [unfilled]% [None] : no pulmonary hypertension [de-identified] : 9/4/2024: Sinus Rhythm at 72 bpm with low voltage QRS, and poor R wave progression [de-identified] : 3/6/2024: Endocardium not well visualized; grossly normal left ventricular systolic function. EF is approximately 65%. Moderate concentric left ventricular hypertrophy. There is mild (grade 1) left ventricular diastolic dysfunction. Normal right ventricular cavity size and normal systolic function. Thickened pericardium with a small pericardial effusion, measuring approximately 0.9 cm at its largest dimension adjacent to the right ventricle. Mild mitral regurgitation. Mild mitral valve stenosis. Mitral annular calcification with thickened mitral valve leaflets and mildly decreased opening. Calcified aortic valve with decreased opening. The aortic valve area is approximately 1.6 sqcm, by the continuity equation, which is consistent with mild aortic stenosis. No echocardiographic evidence of pulmonary hypertension. Mild tricuspid regurgitation.  PASP= 28 mmHg. Trace aortic regurgitation.    3/8/2022: Mild mitral regurgitation. Mild mitral stenosis. Mild diastolic dysfunction. Mild left ventricular enlargement. Normal left ventricular systolic function with an EF of approximately 70%. Small pericardial effusion. Mild tricuspid regurgitation. No pulmonary HTN. PASP= 30 mmHg.

## 2024-09-09 NOTE — CARDIOLOGY SUMMARY
[___] : [unfilled] [LVEF ___%] : LVEF [unfilled]% [None] : no pulmonary hypertension [de-identified] : 9/4/2024: Sinus Rhythm at 72 bpm with low voltage QRS, and poor R wave progression [de-identified] : 3/6/2024: Endocardium not well visualized; grossly normal left ventricular systolic function. EF is approximately 65%. Moderate concentric left ventricular hypertrophy. There is mild (grade 1) left ventricular diastolic dysfunction. Normal right ventricular cavity size and normal systolic function. Thickened pericardium with a small pericardial effusion, measuring approximately 0.9 cm at its largest dimension adjacent to the right ventricle. Mild mitral regurgitation. Mild mitral valve stenosis. Mitral annular calcification with thickened mitral valve leaflets and mildly decreased opening. Calcified aortic valve with decreased opening. The aortic valve area is approximately 1.6 sqcm, by the continuity equation, which is consistent with mild aortic stenosis. No echocardiographic evidence of pulmonary hypertension. Mild tricuspid regurgitation.  PASP= 28 mmHg. Trace aortic regurgitation.    3/8/2022: Mild mitral regurgitation. Mild mitral stenosis. Mild diastolic dysfunction. Mild left ventricular enlargement. Normal left ventricular systolic function with an EF of approximately 70%. Small pericardial effusion. Mild tricuspid regurgitation. No pulmonary HTN. PASP= 30 mmHg.

## 2024-09-09 NOTE — HISTORY OF PRESENT ILLNESS
[FreeTextEntry1] : Patient is a 79 year old woman with a history of hyperlipidemia, hypothyroidism, mitral regurgitation, SVT, and APCs who presents today for follow up of her rhythm disorder and hyperlipidemia. 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 She otherwise denies any chest pain, shortness of breath, palpitations, headaches or dizziness.

## 2024-11-06 ENCOUNTER — RX ONLY (RX ONLY)
Age: 79
End: 2024-11-06

## 2024-11-06 ENCOUNTER — OFFICE (OUTPATIENT)
Age: 79
Setting detail: OPHTHALMOLOGY
End: 2024-11-06
Payer: MEDICARE

## 2024-11-06 DIAGNOSIS — Z96.1: ICD-10-CM

## 2024-11-06 DIAGNOSIS — H10.533: ICD-10-CM

## 2024-11-06 DIAGNOSIS — H18.593: ICD-10-CM

## 2024-11-06 DIAGNOSIS — H04.123: ICD-10-CM

## 2024-11-06 DIAGNOSIS — H18.592: ICD-10-CM

## 2024-11-06 DIAGNOSIS — H16.223: ICD-10-CM

## 2024-11-06 DIAGNOSIS — H18.591: ICD-10-CM

## 2024-11-06 DIAGNOSIS — H18.451: ICD-10-CM

## 2024-11-06 PROCEDURE — 92012 INTRM OPH EXAM EST PATIENT: CPT | Performed by: OPHTHALMOLOGY

## 2024-11-06 ASSESSMENT — REFRACTION_CURRENTRX
OS_VPRISM_DIRECTION: SV
OD_ADD: -0.25
OS_AXIS: 061
OD_AXIS: 104
OS_CYLINDER: -0.75
OD_SPHERE: +3.25
OS_SPHERE: +1.25
OS_OVR_VA: 20/
OD_AXIS: 114
OD_CYLINDER: +3.25
OS_CYLINDER: -0.75
OD_CYLINDER: -3.25
OD_CYLINDER: -3.25
OS_AXIS: 146
OS_AXIS: 083
OD_AXIS: 112
OS_VPRISM_DIRECTION: PROGS
OS_VPRISM_DIRECTION: SV
OS_CYLINDER: +0.75
OD_SPHERE: +0.25
OS_OVR_VA: 20/
OS_VPRISM_DIRECTION: SV
OS_SPHERE: +4.00
OS_ADD: -0.25
OD_VPRISM_DIRECTION: SV
OD_SPHERE: PLANO
OD_VPRISM_DIRECTION: PROGS
OD_VPRISM_DIRECTION: SV
OD_SPHERE: PLANO
OS_AXIS: 082
OS_SPHERE: +3.25
OS_VPRISM_DIRECTION: SV
OD_VPRISM_DIRECTION: SV
OS_OVR_VA: 20/
OD_VPRISM_DIRECTION: SV
OD_AXIS: 026
OS_SPHERE: +1.00
OD_CYLINDER: -1.00
OS_VPRISM_DIRECTION: SV
OD_AXIS: 112
OD_OVR_VA: 20/
OD_SPHERE: +1.00
OD_VPRISM_DIRECTION: SV
OS_CYLINDER: -1.00
OS_AXIS: 059
OS_CYLINDER: -1.00
OD_SPHERE: +1.00
OS_SPHERE: PLANO
OD_OVR_VA: 20/
OD_AXIS: 114
OS_SPHERE: +1.25
OD_CYLINDER: -4.00
OD_OVR_VA: 20/
OD_CYLINDER: -1.00

## 2024-11-06 ASSESSMENT — REFRACTION_MANIFEST
OS_VA1: 20/30-2
OS_VA1: 20/30
OS_ADD: +3.00
OS_AXIS: 080
OD_ADD: +3.00
OS_SPHERE: +0.25
OS_ADD: +3.00
OS_AXIS: 065
OS_ADD: +3.25
OD_SPHERE: PLANO
OD_ADD: +3.25
OD_SPHERE: -3.00
OD_SPHERE: UNABLE
OD_VA1: 20/NI
OS_AXIS: 160
OD_VA2: 20/30+2
OD_AXIS: 025
OS_VA1: 20/25
OS_SPHERE: +1.25
OD_CYLINDER: +3.25
OS_CYLINDER: +0.75
OS_SPHERE: +0.75
OD_SPHERE: BALANCE
OS_AXIS: 155
OD_VA1: 20/40
OS_SPHERE: +1.25
OS_CYLINDER: -0.75
OS_VA2: 20/30+2
OS_CYLINDER: +0.75
OS_CYLINDER: -1.00

## 2024-11-06 ASSESSMENT — KERATOMETRY
OD_K1POWER_DIOPTERS: 45.50
METHOD_AUTO_MANUAL: AUTO
OS_K1POWER_DIOPTERS: 45.75
OD_K2POWER_DIOPTERS: 45.75
OS_AXISANGLE_DEGREES: 175
OD_AXISANGLE_DEGREES: 012
OS_K2POWER_DIOPTERS: 46.00

## 2024-11-06 ASSESSMENT — REFRACTION_AUTOREFRACTION
OD_CYLINDER: -1.25
OS_SPHERE: +1.50
OS_AXIS: 078
OD_AXIS: 119
OS_CYLINDER: -0.75
OD_SPHERE: -0.75

## 2024-11-06 ASSESSMENT — VISUAL ACUITY
OS_BCVA: 20/60-1
OD_BCVA: 20/30-2

## 2024-11-06 ASSESSMENT — PACHYMETRY
OD_CT_CORRECTION: -2
OD_CT_UM: 578
OS_CT_UM: 504
OS_CT_CORRECTION: 3

## 2024-11-06 ASSESSMENT — TEAR BREAK UP TIME (TBUT)
OD_TBUT: T 1+
OS_TBUT: T 1+

## 2024-11-06 ASSESSMENT — CORNEAL EDEMA - FOLDS/STRIAE: OD_FOLDSSTRIAE: T 1+

## 2024-11-06 ASSESSMENT — SUPERFICIAL PUNCTATE KERATITIS (SPK)
OD_SPK: 3+
OS_SPK: 2+ 3+

## 2024-11-06 ASSESSMENT — TONOMETRY
OD_IOP_MMHG: 19
OS_IOP_MMHG: 16

## 2024-11-06 ASSESSMENT — CORNEAL EDEMA CLINICAL DESCRIPTION: OD_CORNEALEDEMA: T DEEP FOLDS

## 2024-12-19 ENCOUNTER — APPOINTMENT (OUTPATIENT)
Dept: INTERNAL MEDICINE | Facility: CLINIC | Age: 79
End: 2024-12-19
Payer: MEDICARE

## 2024-12-19 VITALS
DIASTOLIC BLOOD PRESSURE: 79 MMHG | WEIGHT: 142 LBS | HEIGHT: 59 IN | BODY MASS INDEX: 28.63 KG/M2 | SYSTOLIC BLOOD PRESSURE: 117 MMHG | OXYGEN SATURATION: 97 % | TEMPERATURE: 98.1 F | RESPIRATION RATE: 14 BRPM | HEART RATE: 83 BPM

## 2024-12-19 DIAGNOSIS — E03.9 HYPOTHYROIDISM, UNSPECIFIED: ICD-10-CM

## 2024-12-19 DIAGNOSIS — E78.00 PURE HYPERCHOLESTEROLEMIA, UNSPECIFIED: ICD-10-CM

## 2024-12-19 DIAGNOSIS — I10 ESSENTIAL (PRIMARY) HYPERTENSION: ICD-10-CM

## 2024-12-19 PROCEDURE — 99214 OFFICE O/P EST MOD 30 MIN: CPT

## 2024-12-19 RX ORDER — PNEUMOCOCCAL 20-VALENT CONJUGATE VACCINE 2.2; 2.2; 2.2; 2.2; 2.2; 2.2; 2.2; 2.2; 2.2; 2.2; 2.2; 2.2; 2.2; 2.2; 2.2; 2.2; 4.4; 2.2; 2.2; 2.2 UG/.5ML; UG/.5ML; UG/.5ML; UG/.5ML; UG/.5ML; UG/.5ML; UG/.5ML; UG/.5ML; UG/.5ML; UG/.5ML; UG/.5ML; UG/.5ML; UG/.5ML; UG/.5ML; UG/.5ML; UG/.5ML; UG/.5ML; UG/.5ML; UG/.5ML; UG/.5ML
0.5 INJECTION, SUSPENSION INTRAMUSCULAR
Qty: 1 | Refills: 0 | Status: ACTIVE | COMMUNITY
Start: 2024-12-19 | End: 1900-01-01

## 2024-12-20 LAB
ESTIMATED AVERAGE GLUCOSE: 108 MG/DL
HBA1C MFR BLD HPLC: 5.4 %

## 2024-12-21 LAB
ALBUMIN SERPL ELPH-MCNC: 4.2 G/DL
ALP BLD-CCNC: 87 U/L
ALT SERPL-CCNC: 16 U/L
ANION GAP SERPL CALC-SCNC: 16 MMOL/L
AST SERPL-CCNC: 23 U/L
BILIRUB SERPL-MCNC: 0.2 MG/DL
BUN SERPL-MCNC: 14 MG/DL
CALCIUM SERPL-MCNC: 9.8 MG/DL
CHLORIDE SERPL-SCNC: 104 MMOL/L
CHOLEST SERPL-MCNC: 192 MG/DL
CO2 SERPL-SCNC: 25 MMOL/L
CREAT SERPL-MCNC: 1.16 MG/DL
EGFR: 49 ML/MIN/1.73M2
GLUCOSE SERPL-MCNC: 84 MG/DL
HDLC SERPL-MCNC: 54 MG/DL
LDLC SERPL CALC-MCNC: 116 MG/DL
NONHDLC SERPL-MCNC: 138 MG/DL
POTASSIUM SERPL-SCNC: 5.5 MMOL/L
PROT SERPL-MCNC: 7.1 G/DL
SODIUM SERPL-SCNC: 144 MMOL/L
TRIGL SERPL-MCNC: 124 MG/DL
TSH SERPL-ACNC: 3.38 UIU/ML

## 2025-04-12 ENCOUNTER — OFFICE (OUTPATIENT)
Facility: LOCATION | Age: 80
Setting detail: OPHTHALMOLOGY
End: 2025-04-12
Payer: MEDICARE

## 2025-04-12 DIAGNOSIS — H35.3132: ICD-10-CM

## 2025-04-12 DIAGNOSIS — H04.123: ICD-10-CM

## 2025-04-12 DIAGNOSIS — Z96.1: ICD-10-CM

## 2025-04-12 DIAGNOSIS — H43.392: ICD-10-CM

## 2025-04-12 DIAGNOSIS — H16.223: ICD-10-CM

## 2025-04-12 PROBLEM — H01.005 BLEPHARITIS; RIGHT UPPER LID, RIGHT LOWER LID, LEFT UPPER LID, LEFT LOWER LID: Status: ACTIVE | Noted: 2025-04-12

## 2025-04-12 PROBLEM — H01.004 BLEPHARITIS; RIGHT UPPER LID, RIGHT LOWER LID, LEFT UPPER LID, LEFT LOWER LID: Status: ACTIVE | Noted: 2025-04-12

## 2025-04-12 PROBLEM — H01.001 BLEPHARITIS; RIGHT UPPER LID, RIGHT LOWER LID, LEFT UPPER LID, LEFT LOWER LID: Status: ACTIVE | Noted: 2025-04-12

## 2025-04-12 PROBLEM — H01.002 BLEPHARITIS; RIGHT UPPER LID, RIGHT LOWER LID, LEFT UPPER LID, LEFT LOWER LID: Status: ACTIVE | Noted: 2025-04-12

## 2025-04-12 PROCEDURE — 68761 CLOSE TEAR DUCT OPENING: CPT | Mod: 50 | Performed by: OPHTHALMOLOGY

## 2025-04-12 PROCEDURE — 92014 COMPRE OPH EXAM EST PT 1/>: CPT | Mod: 25 | Performed by: OPHTHALMOLOGY

## 2025-04-12 ASSESSMENT — REFRACTION_CURRENTRX
OS_AXIS: 146
OD_AXIS: 114
OS_VPRISM_DIRECTION: PROGS
OD_VPRISM_DIRECTION: SV
OD_CYLINDER: +3.25
OD_AXIS: 103
OS_VPRISM_DIRECTION: SV
OD_VPRISM_DIRECTION: SV
OD_VPRISM_DIRECTION: SV
OD_ADD: -0.25
OD_VPRISM_DIRECTION: PROGS
OD_CYLINDER: -1.00
OD_SPHERE: +1.00
OD_CYLINDER: -3.25
OS_AXIS: 061
OD_SPHERE: PLANO
OS_SPHERE: +4.00
OS_OVR_VA: 20/
OS_SPHERE: +3.25
OS_OVR_VA: 20/
OS_CYLINDER: +0.75
OS_ADD: -0.25
OD_AXIS: 112
OD_OVR_VA: 20/
OS_VPRISM_DIRECTION: SV
OD_AXIS: 114
OD_AXIS: 104
OD_CYLINDER: -1.00
OD_OVR_VA: 20/
OD_AXIS: 026
OS_VPRISM_DIRECTION: SV
OD_OVR_VA: 20/
OD_CYLINDER: -4.00
OD_VPRISM_DIRECTION: SV
OS_AXIS: 083
OS_OVR_VA: 20/
OS_AXIS: 082
OS_SPHERE: +1.25
OD_VPRISM_DIRECTION: SV
OD_CYLINDER: -3.25
OS_SPHERE: +1.25
OD_AXIS: 112
OS_CYLINDER: -0.75
OS_SPHERE: +1.00
OD_SPHERE: +1.00
OS_AXIS: 076
OS_CYLINDER: -1.00
OS_SPHERE: +1.25
OS_CYLINDER: -0.75
OS_AXIS: 059
OS_VPRISM_DIRECTION: SV
OS_VPRISM_DIRECTION: SV
OD_SPHERE: PLANO
OS_CYLINDER: -1.00
OS_VPRISM_DIRECTION: SV
OS_CYLINDER: -1.00
OD_SPHERE: +0.25
OD_CYLINDER: -1.00
OD_VPRISM_DIRECTION: SV
OS_SPHERE: PLANO
OD_SPHERE: +3.25
OD_SPHERE: +1.00

## 2025-04-12 ASSESSMENT — KERATOMETRY
OD_K1POWER_DIOPTERS: 45.50
METHOD_AUTO_MANUAL: AUTO
OD_K2POWER_DIOPTERS: 45.50
OS_K2POWER_DIOPTERS: 46.00
OS_AXISANGLE_DEGREES: 177
OS_K1POWER_DIOPTERS: 45.25
OD_AXISANGLE_DEGREES: 012

## 2025-04-12 ASSESSMENT — LID EXAM ASSESSMENTS
OD_BLEPHARITIS: RLL RUL 1+
OS_BLEPHARITIS: LLL LUL 1+

## 2025-04-12 ASSESSMENT — REFRACTION_MANIFEST
OS_VA1: 20/30-2
OS_AXIS: 160
OS_SPHERE: +0.75
OS_ADD: +3.25
OS_CYLINDER: -0.75
OD_AXIS: 025
OD_VA1: 20/40
OS_SPHERE: +1.25
OS_SPHERE: +0.25
OD_SPHERE: PLANO
OS_ADD: +3.00
OD_VA2: 20/30+2
OD_CYLINDER: +3.25
OS_AXIS: 065
OS_SPHERE: +1.25
OS_AXIS: 080
OS_CYLINDER: +0.75
OS_CYLINDER: +0.75
OS_VA1: 20/30
OS_VA2: 20/30+2
OS_AXIS: 155
OS_CYLINDER: -1.00
OD_SPHERE: UNABLE
OD_ADD: +3.25
OD_VA1: 20/NI
OD_ADD: +3.00
OS_VA1: 20/25
OD_SPHERE: -3.00
OD_SPHERE: BALANCE
OS_ADD: +3.00

## 2025-04-12 ASSESSMENT — REFRACTION_AUTOREFRACTION
OD_CYLINDER: -1.75
OS_AXIS: 089
OS_SPHERE: +1.75
OD_SPHERE: -0.50
OS_CYLINDER: -1.00
OD_AXIS: 104

## 2025-04-12 ASSESSMENT — PUNCTA - ASSESSMENT
OD_PUNCTA: RLL
OS_PUNCTA: LLL

## 2025-04-12 ASSESSMENT — PACHYMETRY
OS_CT_UM: 504
OS_CT_CORRECTION: 3
OD_CT_UM: 578
OD_CT_CORRECTION: -2

## 2025-04-12 ASSESSMENT — CONFRONTATIONAL VISUAL FIELD TEST (CVF)
OD_FINDINGS: FULL
OS_FINDINGS: FULL

## 2025-04-12 ASSESSMENT — VISUAL ACUITY
OD_BCVA: 20/30-2
OS_BCVA: 20/50-2

## 2025-04-12 ASSESSMENT — CORNEAL SURGICAL SCARRING: OD_SCARRING: STROMAL

## 2025-04-12 ASSESSMENT — TEAR BREAK UP TIME (TBUT)
OD_TBUT: T 1+
OS_TBUT: T 1+

## 2025-04-12 ASSESSMENT — SUPERFICIAL PUNCTATE KERATITIS (SPK)
OD_SPK: 3+
OS_SPK: 2+ 3+

## 2025-04-12 ASSESSMENT — CORNEAL EDEMA CLINICAL DESCRIPTION: OD_CORNEALEDEMA: T DEEP FOLDS

## 2025-04-12 ASSESSMENT — CORNEAL EDEMA - FOLDS/STRIAE: OD_FOLDSSTRIAE: T 1+

## 2025-08-05 ENCOUNTER — APPOINTMENT (OUTPATIENT)
Dept: INTERNAL MEDICINE | Facility: CLINIC | Age: 80
End: 2025-08-05
Payer: MEDICARE

## 2025-08-05 VITALS
WEIGHT: 147 LBS | HEART RATE: 80 BPM | HEIGHT: 59 IN | RESPIRATION RATE: 14 BRPM | DIASTOLIC BLOOD PRESSURE: 73 MMHG | SYSTOLIC BLOOD PRESSURE: 116 MMHG | OXYGEN SATURATION: 97 % | BODY MASS INDEX: 29.64 KG/M2 | TEMPERATURE: 97.4 F

## 2025-08-05 DIAGNOSIS — E03.9 HYPOTHYROIDISM, UNSPECIFIED: ICD-10-CM

## 2025-08-05 DIAGNOSIS — M54.2 CERVICALGIA: ICD-10-CM

## 2025-08-05 DIAGNOSIS — E78.00 PURE HYPERCHOLESTEROLEMIA, UNSPECIFIED: ICD-10-CM

## 2025-08-05 DIAGNOSIS — M81.0 AGE-RELATED OSTEOPOROSIS W/OUT CURRENT PATHOLOGICAL FRACTURE: ICD-10-CM

## 2025-08-05 DIAGNOSIS — M65.30 TRIGGER FINGER, UNSPECIFIED FINGER: ICD-10-CM

## 2025-08-05 PROCEDURE — 99214 OFFICE O/P EST MOD 30 MIN: CPT

## 2025-08-15 LAB
ALBUMIN SERPL ELPH-MCNC: 4.2 G/DL
ALP BLD-CCNC: 92 U/L
ALT SERPL-CCNC: 19 U/L
ANION GAP SERPL CALC-SCNC: 12 MMOL/L
AST SERPL-CCNC: 25 U/L
BASOPHILS # BLD AUTO: 0.05 K/UL
BASOPHILS NFR BLD AUTO: 1 %
BILIRUB SERPL-MCNC: 0.4 MG/DL
BUN SERPL-MCNC: 15 MG/DL
CALCIUM SERPL-MCNC: 9.3 MG/DL
CHLORIDE SERPL-SCNC: 102 MMOL/L
CHOLEST SERPL-MCNC: 179 MG/DL
CO2 SERPL-SCNC: 26 MMOL/L
CREAT SERPL-MCNC: 1.09 MG/DL
EGFRCR SERPLBLD CKD-EPI 2021: 52 ML/MIN/1.73M2
EOSINOPHIL # BLD AUTO: 0.04 K/UL
EOSINOPHIL NFR BLD AUTO: 0.8 %
ESTIMATED AVERAGE GLUCOSE: 108 MG/DL
GLUCOSE SERPL-MCNC: 79 MG/DL
HBA1C MFR BLD HPLC: 5.4 %
HCT VFR BLD CALC: 39.6 %
HDLC SERPL-MCNC: 53 MG/DL
HGB BLD-MCNC: 12.6 G/DL
IMM GRANULOCYTES NFR BLD AUTO: 0.4 %
LDLC SERPL-MCNC: 98 MG/DL
LYMPHOCYTES # BLD AUTO: 1.51 K/UL
LYMPHOCYTES NFR BLD AUTO: 29.8 %
MAN DIFF?: NORMAL
MCHC RBC-ENTMCNC: 29.4 PG
MCHC RBC-ENTMCNC: 31.8 G/DL
MCV RBC AUTO: 92.3 FL
MONOCYTES # BLD AUTO: 0.46 K/UL
MONOCYTES NFR BLD AUTO: 9.1 %
NEUTROPHILS # BLD AUTO: 2.98 K/UL
NEUTROPHILS NFR BLD AUTO: 58.9 %
NONHDLC SERPL-MCNC: 126 MG/DL
PLATELET # BLD AUTO: 199 K/UL
POTASSIUM SERPL-SCNC: 4.8 MMOL/L
PROT SERPL-MCNC: 6.9 G/DL
RBC # BLD: 4.29 M/UL
RBC # FLD: 13 %
SODIUM SERPL-SCNC: 140 MMOL/L
TRIGL SERPL-MCNC: 161 MG/DL
TSH SERPL-ACNC: 2.23 UIU/ML
WBC # FLD AUTO: 5.06 K/UL

## 2025-09-10 ENCOUNTER — APPOINTMENT (OUTPATIENT)
Dept: INTERNAL MEDICINE | Facility: CLINIC | Age: 80
End: 2025-09-10
Payer: MEDICARE

## 2025-09-10 VITALS
TEMPERATURE: 97.4 F | WEIGHT: 146 LBS | DIASTOLIC BLOOD PRESSURE: 78 MMHG | RESPIRATION RATE: 15 BRPM | BODY MASS INDEX: 29.43 KG/M2 | HEART RATE: 92 BPM | SYSTOLIC BLOOD PRESSURE: 122 MMHG | HEIGHT: 59 IN | OXYGEN SATURATION: 95 %

## 2025-09-10 DIAGNOSIS — M25.561 PAIN IN RIGHT KNEE: ICD-10-CM

## 2025-09-10 DIAGNOSIS — M79.89 OTHER SPECIFIED SOFT TISSUE DISORDERS: ICD-10-CM

## 2025-09-10 PROCEDURE — 99214 OFFICE O/P EST MOD 30 MIN: CPT

## 2025-09-10 RX ORDER — NAPROXEN 500 MG/1
500 TABLET ORAL
Qty: 14 | Refills: 0 | Status: ACTIVE | COMMUNITY
Start: 2025-09-10 | End: 1900-01-01